# Patient Record
Sex: FEMALE | Race: WHITE | NOT HISPANIC OR LATINO | Employment: FULL TIME | URBAN - NONMETROPOLITAN AREA
[De-identification: names, ages, dates, MRNs, and addresses within clinical notes are randomized per-mention and may not be internally consistent; named-entity substitution may affect disease eponyms.]

---

## 2017-04-30 ENCOUNTER — HOSPITAL ENCOUNTER (OUTPATIENT)
Facility: HOSPITAL | Age: 27
Discharge: HOME | End: 2017-04-30
Attending: OBSTETRICS & GYNECOLOGY | Admitting: OBSTETRICS & GYNECOLOGY

## 2017-04-30 VITALS — HEIGHT: 66 IN | WEIGHT: 185.2 LBS | TEMPERATURE: 98.6 F | RESPIRATION RATE: 18 BRPM | BODY MASS INDEX: 29.77 KG/M2

## 2017-04-30 PROBLEM — Z34.90 PREGNANCY: Status: ACTIVE | Noted: 2017-04-30

## 2017-04-30 LAB
BILIRUB UR QL STRIP: NEGATIVE
CLARITY UR: CLEAR
COLOR UR: YELLOW
DEPRECATED RDW RBC AUTO: 44.9 FL (ref 37–54)
ERYTHROCYTE [DISTWIDTH] IN BLOOD BY AUTOMATED COUNT: 13.8 % (ref 11.5–14.5)
GLUCOSE UR STRIP-MCNC: NEGATIVE MG/DL
HCT VFR BLD AUTO: 35.1 % (ref 37–47)
HGB BLD-MCNC: 11.7 G/DL (ref 12–16)
HGB UR QL STRIP.AUTO: NEGATIVE
KETONES UR QL STRIP: NEGATIVE
LEUKOCYTE ESTERASE UR QL STRIP.AUTO: NEGATIVE
MCH RBC QN AUTO: 31.5 PG (ref 27–33)
MCHC RBC AUTO-ENTMCNC: 33.3 G/DL (ref 33–37)
MCV RBC AUTO: 94.4 FL (ref 80–94)
NITRITE UR QL STRIP: NEGATIVE
PH UR STRIP.AUTO: 8 [PH] (ref 5–8)
PLATELET # BLD AUTO: 187 10*3/MM3 (ref 130–400)
PMV BLD AUTO: 11.7 FL (ref 6–10)
PROT UR QL STRIP: NEGATIVE
RBC # BLD AUTO: 3.72 10*6/MM3 (ref 4.2–5.4)
SP GR UR STRIP: <=1.005 (ref 1–1.03)
UROBILINOGEN UR QL STRIP: NORMAL
WBC NRBC COR # BLD: 8.73 10*3/MM3 (ref 4.5–12.5)

## 2017-04-30 PROCEDURE — P9612 CATHETERIZE FOR URINE SPEC: HCPCS

## 2017-04-30 PROCEDURE — 81003 URINALYSIS AUTO W/O SCOPE: CPT | Performed by: OBSTETRICS & GYNECOLOGY

## 2017-04-30 PROCEDURE — G0463 HOSPITAL OUTPT CLINIC VISIT: HCPCS

## 2017-04-30 PROCEDURE — 85027 COMPLETE CBC AUTOMATED: CPT | Performed by: OBSTETRICS & GYNECOLOGY

## 2017-04-30 RX ORDER — PRENATAL VIT NO.126/IRON/FOLIC 28MG-0.8MG
TABLET ORAL DAILY
COMMUNITY
End: 2019-10-08

## 2019-10-06 ENCOUNTER — PREP FOR SURGERY (OUTPATIENT)
Dept: OTHER | Facility: HOSPITAL | Age: 29
End: 2019-10-06

## 2019-10-06 DIAGNOSIS — N80.9 ENDOMETRIOSIS DETERMINED BY LAPAROSCOPY: ICD-10-CM

## 2019-10-06 DIAGNOSIS — R10.2 PELVIC PAIN: Primary | ICD-10-CM

## 2019-10-06 RX ORDER — SODIUM CHLORIDE 0.9 % (FLUSH) 0.9 %
3 SYRINGE (ML) INJECTION EVERY 12 HOURS SCHEDULED
Status: CANCELLED | OUTPATIENT
Start: 2019-10-09

## 2019-10-06 RX ORDER — SODIUM CHLORIDE 0.9 % (FLUSH) 0.9 %
10 SYRINGE (ML) INJECTION AS NEEDED
Status: CANCELLED | OUTPATIENT
Start: 2019-10-09

## 2019-10-08 ENCOUNTER — APPOINTMENT (OUTPATIENT)
Dept: PREADMISSION TESTING | Facility: HOSPITAL | Age: 29
End: 2019-10-08

## 2019-10-08 DIAGNOSIS — R10.2 PELVIC PAIN: ICD-10-CM

## 2019-10-08 DIAGNOSIS — N80.9 ENDOMETRIOSIS DETERMINED BY LAPAROSCOPY: ICD-10-CM

## 2019-10-08 LAB
ABO GROUP BLD: NORMAL
BASOPHILS # BLD AUTO: 0.04 10*3/MM3 (ref 0–0.2)
BASOPHILS NFR BLD AUTO: 0.6 % (ref 0–1.5)
BLD GP AB SCN SERPL QL: NEGATIVE
DEPRECATED RDW RBC AUTO: 43.1 FL (ref 37–54)
EOSINOPHIL # BLD AUTO: 0.08 10*3/MM3 (ref 0–0.4)
EOSINOPHIL NFR BLD AUTO: 1.1 % (ref 0.3–6.2)
ERYTHROCYTE [DISTWIDTH] IN BLOOD BY AUTOMATED COUNT: 12.7 % (ref 12.3–15.4)
HCT VFR BLD AUTO: 46 % (ref 34–46.6)
HGB BLD-MCNC: 15 G/DL (ref 12–15.9)
IMM GRANULOCYTES # BLD AUTO: 0.01 10*3/MM3 (ref 0–0.05)
IMM GRANULOCYTES NFR BLD AUTO: 0.1 % (ref 0–0.5)
LYMPHOCYTES # BLD AUTO: 1.77 10*3/MM3 (ref 0.7–3.1)
LYMPHOCYTES NFR BLD AUTO: 24.8 % (ref 19.6–45.3)
MCH RBC QN AUTO: 30.2 PG (ref 26.6–33)
MCHC RBC AUTO-ENTMCNC: 32.6 G/DL (ref 31.5–35.7)
MCV RBC AUTO: 92.7 FL (ref 79–97)
MONOCYTES # BLD AUTO: 0.48 10*3/MM3 (ref 0.1–0.9)
MONOCYTES NFR BLD AUTO: 6.7 % (ref 5–12)
NEUTROPHILS # BLD AUTO: 4.75 10*3/MM3 (ref 1.7–7)
NEUTROPHILS NFR BLD AUTO: 66.7 % (ref 42.7–76)
PLATELET # BLD AUTO: 214 10*3/MM3 (ref 140–450)
PMV BLD AUTO: 12.6 FL (ref 6–12)
RBC # BLD AUTO: 4.96 10*6/MM3 (ref 3.77–5.28)
RH BLD: POSITIVE
T&S EXPIRATION DATE: NORMAL
WBC NRBC COR # BLD: 7.13 10*3/MM3 (ref 3.4–10.8)

## 2019-10-08 PROCEDURE — 85025 COMPLETE CBC W/AUTO DIFF WBC: CPT | Performed by: NURSE PRACTITIONER

## 2019-10-08 PROCEDURE — 86901 BLOOD TYPING SEROLOGIC RH(D): CPT | Performed by: NURSE PRACTITIONER

## 2019-10-08 PROCEDURE — 36415 COLL VENOUS BLD VENIPUNCTURE: CPT

## 2019-10-08 PROCEDURE — 86850 RBC ANTIBODY SCREEN: CPT | Performed by: NURSE PRACTITIONER

## 2019-10-08 PROCEDURE — 86900 BLOOD TYPING SEROLOGIC ABO: CPT | Performed by: NURSE PRACTITIONER

## 2019-10-08 RX ORDER — TRAZODONE HYDROCHLORIDE 100 MG/1
100 TABLET ORAL NIGHTLY
COMMUNITY
End: 2020-09-22 | Stop reason: SDUPTHER

## 2019-10-08 RX ORDER — ESCITALOPRAM OXALATE 20 MG/1
20 TABLET ORAL DAILY
COMMUNITY

## 2019-10-08 RX ORDER — TOPIRAMATE 50 MG/1
50 TABLET, FILM COATED ORAL 2 TIMES DAILY
COMMUNITY
End: 2020-09-22

## 2019-10-08 NOTE — DISCHARGE INSTRUCTIONS
1200----10/09/2019       ARRIVAL TIME    TAKE the following medications the morning of surgery:  All heart or blood pressure medications    HOLD all diabetic medications the morning of surgery as ordered by physician.    Please discontinue all blood thinners and anticoagulants (except aspirin) prior to surgery as per your surgeon and cardiologist instructions.  Aspirin may be continued up to the day prior to surgery.     CHLORHEXIDINE CLOTHS GIVEN WITH INSTRUCTIONS AND FORM TO RETURN TO HOSPITAL    General Instructions:  · Do not eat or drink after midnight: includes water, mints, or gum. You may brush your teeth.  Dental appliances that are removable must be taken out day of surgery.  · Do not smoke, chew tobacco, or drink alcohol.  · Bring medications in original bottles, any inhalers and if applicable your C-PAP/BI-PAP machine.  · Bring any papers given to you in the doctor's office.  · Wear clean comfortable clothes and socks.  · Do not wear contact lenses or make-up. Bring a case for your glasses if applicable.  · Bring crutches or walker if applicable.  · Leave all other valuables and jewelry at home.    If you were given a blood bank ID arm band remember to bring it with you the day of surgery.    Preventing a Surgical Site Infection:  Shower the night before surgery (unless instructed other wise) using a fresh bar of anti-bacterial soap (such as Dial) and clean washcloth. Dry with a clean towel and dress in clean clothing.  For 2 to 3 days before surgery, avoid shaving with a razor near where you will have surgery because the razor can irritate skin and make it easier to develop an infection. Ask your surgeon if you will be receiving antibiotics prior to surgery.  Make sure you, your family, and all healthcare providers clear their hands with soap and water or an alcohol-based hand  before caring for you or your wound.  If at all possible, quit smoking as many days before surgery as you can.    Day  of surgery:  Upon arrival, a Pre-op nurse and Anesthesiologist will review your health history, obtain vital signs, and answer questions you may have. The only belongings needed at this time will be your home medications and if applicable your C-PAP/BI-PAP machine. If you are staying overnight your family can leave the rest of your belongings in the car and bring them to your room later. A Pre-op nurse will start an IV and you may receive medication in preparation for surgery, including something to help you relax. Your family will be able to see you in the Pre-op area. While you are in surgery your family should notify the waiting room  if they leave the waiting room area and provide a contact phone number.    Please be aware that surgery does come with discomfort. We want to make every effort to control your discomfort so please discuss any uncontrolled symptoms with your nurse. Your doctor will most likely have prescribed pain medications.    If you are going home after surgery you will receive individualized written care instructions before being discharged. A responsible adult must drive you to and from the hospital on the day of surgery and stay with you for 24 hours.    If you are staying overnight following surgery, you will be transported to your hospital room following the recovery period.  [facility] has all private rooms.    If you have any questions please call Pre-Admission Testing at [phone].  Deductibles and co-payments are collected on the day of service. Please be prepared to pay the required co-pay, deductible or deposit on the day of service as defined by your plan.

## 2019-10-09 ENCOUNTER — ANESTHESIA (OUTPATIENT)
Dept: PERIOP | Facility: HOSPITAL | Age: 29
End: 2019-10-09

## 2019-10-09 ENCOUNTER — APPOINTMENT (OUTPATIENT)
Dept: GENERAL RADIOLOGY | Facility: HOSPITAL | Age: 29
End: 2019-10-09

## 2019-10-09 ENCOUNTER — ANESTHESIA EVENT (OUTPATIENT)
Dept: PERIOP | Facility: HOSPITAL | Age: 29
End: 2019-10-09

## 2019-10-09 ENCOUNTER — HOSPITAL ENCOUNTER (OUTPATIENT)
Facility: HOSPITAL | Age: 29
Setting detail: HOSPITAL OUTPATIENT SURGERY
Discharge: HOME | End: 2019-10-09
Attending: OBSTETRICS & GYNECOLOGY | Admitting: OBSTETRICS & GYNECOLOGY

## 2019-10-09 VITALS
BODY MASS INDEX: 23.97 KG/M2 | TEMPERATURE: 97.7 F | OXYGEN SATURATION: 99 % | RESPIRATION RATE: 18 BRPM | HEIGHT: 66 IN | DIASTOLIC BLOOD PRESSURE: 77 MMHG | HEART RATE: 74 BPM | SYSTOLIC BLOOD PRESSURE: 120 MMHG | WEIGHT: 149.13 LBS

## 2019-10-09 DIAGNOSIS — N80.9 ENDOMETRIOSIS DETERMINED BY LAPAROSCOPY: ICD-10-CM

## 2019-10-09 DIAGNOSIS — R10.2 PELVIC PAIN: ICD-10-CM

## 2019-10-09 LAB
B-HCG UR QL: NEGATIVE
INTERNAL NEGATIVE CONTROL: NEGATIVE
INTERNAL POSITIVE CONTROL: POSITIVE
Lab: NORMAL

## 2019-10-09 PROCEDURE — 25010000002 NEOSTIGMINE 10 MG/10ML SOLUTION: Performed by: NURSE ANESTHETIST, CERTIFIED REGISTERED

## 2019-10-09 PROCEDURE — 25010000002 PROPOFOL 10 MG/ML EMULSION: Performed by: NURSE ANESTHETIST, CERTIFIED REGISTERED

## 2019-10-09 PROCEDURE — 81025 URINE PREGNANCY TEST: CPT | Performed by: ANESTHESIOLOGY

## 2019-10-09 PROCEDURE — 25010000002 CEFOXITIN: Performed by: NURSE PRACTITIONER

## 2019-10-09 PROCEDURE — 25010000002 DEXAMETHASONE PER 1 MG: Performed by: NURSE ANESTHETIST, CERTIFIED REGISTERED

## 2019-10-09 PROCEDURE — 25010000003 LIDOCAINE 1 % SOLUTION: Performed by: NURSE ANESTHETIST, CERTIFIED REGISTERED

## 2019-10-09 PROCEDURE — 25010000002 ONDANSETRON PER 1 MG: Performed by: NURSE ANESTHETIST, CERTIFIED REGISTERED

## 2019-10-09 PROCEDURE — 25010000002 MIDAZOLAM PER 1 MG: Performed by: NURSE ANESTHETIST, CERTIFIED REGISTERED

## 2019-10-09 PROCEDURE — 25010000002 KETOROLAC TROMETHAMINE PER 15 MG: Performed by: NURSE ANESTHETIST, CERTIFIED REGISTERED

## 2019-10-09 PROCEDURE — 25010000002 FENTANYL CITRATE (PF) 100 MCG/2ML SOLUTION: Performed by: NURSE ANESTHETIST, CERTIFIED REGISTERED

## 2019-10-09 RX ORDER — IPRATROPIUM BROMIDE AND ALBUTEROL SULFATE 2.5; .5 MG/3ML; MG/3ML
3 SOLUTION RESPIRATORY (INHALATION) ONCE AS NEEDED
Status: DISCONTINUED | OUTPATIENT
Start: 2019-10-09 | End: 2019-10-09 | Stop reason: HOSPADM

## 2019-10-09 RX ORDER — SODIUM CHLORIDE 0.9 % (FLUSH) 0.9 %
3 SYRINGE (ML) INJECTION EVERY 12 HOURS SCHEDULED
Status: DISCONTINUED | OUTPATIENT
Start: 2019-10-09 | End: 2019-10-09 | Stop reason: HOSPADM

## 2019-10-09 RX ORDER — LIDOCAINE HYDROCHLORIDE 10 MG/ML
INJECTION, SOLUTION INFILTRATION; PERINEURAL AS NEEDED
Status: DISCONTINUED | OUTPATIENT
Start: 2019-10-09 | End: 2019-10-09 | Stop reason: SURG

## 2019-10-09 RX ORDER — SODIUM CHLORIDE, SODIUM LACTATE, POTASSIUM CHLORIDE, CALCIUM CHLORIDE 600; 310; 30; 20 MG/100ML; MG/100ML; MG/100ML; MG/100ML
125 INJECTION, SOLUTION INTRAVENOUS CONTINUOUS
Status: DISCONTINUED | OUTPATIENT
Start: 2019-10-09 | End: 2019-10-09 | Stop reason: HOSPADM

## 2019-10-09 RX ORDER — KETOROLAC TROMETHAMINE 30 MG/ML
INJECTION, SOLUTION INTRAMUSCULAR; INTRAVENOUS AS NEEDED
Status: DISCONTINUED | OUTPATIENT
Start: 2019-10-09 | End: 2019-10-09 | Stop reason: SURG

## 2019-10-09 RX ORDER — SODIUM CHLORIDE 0.9 % (FLUSH) 0.9 %
10 SYRINGE (ML) INJECTION AS NEEDED
Status: DISCONTINUED | OUTPATIENT
Start: 2019-10-09 | End: 2019-10-09 | Stop reason: HOSPADM

## 2019-10-09 RX ORDER — NEOSTIGMINE METHYLSULFATE 1 MG/ML
INJECTION, SOLUTION INTRAVENOUS AS NEEDED
Status: DISCONTINUED | OUTPATIENT
Start: 2019-10-09 | End: 2019-10-09 | Stop reason: SURG

## 2019-10-09 RX ORDER — FENTANYL CITRATE 50 UG/ML
INJECTION, SOLUTION INTRAMUSCULAR; INTRAVENOUS AS NEEDED
Status: DISCONTINUED | OUTPATIENT
Start: 2019-10-09 | End: 2019-10-09 | Stop reason: SURG

## 2019-10-09 RX ORDER — ONDANSETRON 2 MG/ML
INJECTION INTRAMUSCULAR; INTRAVENOUS AS NEEDED
Status: DISCONTINUED | OUTPATIENT
Start: 2019-10-09 | End: 2019-10-09 | Stop reason: SURG

## 2019-10-09 RX ORDER — ONDANSETRON 2 MG/ML
4 INJECTION INTRAMUSCULAR; INTRAVENOUS AS NEEDED
Status: DISCONTINUED | OUTPATIENT
Start: 2019-10-09 | End: 2019-10-09 | Stop reason: HOSPADM

## 2019-10-09 RX ORDER — IBUPROFEN 600 MG/1
600 TABLET ORAL EVERY 6 HOURS PRN
Qty: 30 TABLET | Refills: 0 | Status: SHIPPED | OUTPATIENT
Start: 2019-10-09 | End: 2019-11-08

## 2019-10-09 RX ORDER — FAMOTIDINE 10 MG/ML
INJECTION, SOLUTION INTRAVENOUS AS NEEDED
Status: DISCONTINUED | OUTPATIENT
Start: 2019-10-09 | End: 2019-10-09 | Stop reason: SURG

## 2019-10-09 RX ORDER — PROPOFOL 10 MG/ML
VIAL (ML) INTRAVENOUS AS NEEDED
Status: DISCONTINUED | OUTPATIENT
Start: 2019-10-09 | End: 2019-10-09 | Stop reason: SURG

## 2019-10-09 RX ORDER — MEPERIDINE HYDROCHLORIDE 25 MG/ML
12.5 INJECTION INTRAMUSCULAR; INTRAVENOUS; SUBCUTANEOUS
Status: DISCONTINUED | OUTPATIENT
Start: 2019-10-09 | End: 2019-10-09 | Stop reason: HOSPADM

## 2019-10-09 RX ORDER — ROCURONIUM BROMIDE 10 MG/ML
INJECTION, SOLUTION INTRAVENOUS AS NEEDED
Status: DISCONTINUED | OUTPATIENT
Start: 2019-10-09 | End: 2019-10-09 | Stop reason: SURG

## 2019-10-09 RX ORDER — FENTANYL CITRATE 50 UG/ML
50 INJECTION, SOLUTION INTRAMUSCULAR; INTRAVENOUS
Status: DISCONTINUED | OUTPATIENT
Start: 2019-10-09 | End: 2019-10-09 | Stop reason: HOSPADM

## 2019-10-09 RX ORDER — SODIUM CHLORIDE 0.9 % (FLUSH) 0.9 %
3-10 SYRINGE (ML) INJECTION AS NEEDED
Status: DISCONTINUED | OUTPATIENT
Start: 2019-10-09 | End: 2019-10-09 | Stop reason: HOSPADM

## 2019-10-09 RX ORDER — DEXAMETHASONE SODIUM PHOSPHATE 4 MG/ML
INJECTION, SOLUTION INTRA-ARTICULAR; INTRALESIONAL; INTRAMUSCULAR; INTRAVENOUS; SOFT TISSUE AS NEEDED
Status: DISCONTINUED | OUTPATIENT
Start: 2019-10-09 | End: 2019-10-09 | Stop reason: SURG

## 2019-10-09 RX ORDER — GLYCOPYRROLATE 0.2 MG/ML
INJECTION INTRAMUSCULAR; INTRAVENOUS AS NEEDED
Status: DISCONTINUED | OUTPATIENT
Start: 2019-10-09 | End: 2019-10-09 | Stop reason: SURG

## 2019-10-09 RX ORDER — HYDROCODONE BITARTRATE AND ACETAMINOPHEN 5; 325 MG/1; MG/1
1 TABLET ORAL EVERY 6 HOURS PRN
Qty: 10 TABLET | Refills: 0 | Status: SHIPPED | OUTPATIENT
Start: 2019-10-09 | End: 2020-05-07

## 2019-10-09 RX ORDER — MIDAZOLAM HYDROCHLORIDE 1 MG/ML
INJECTION INTRAMUSCULAR; INTRAVENOUS AS NEEDED
Status: DISCONTINUED | OUTPATIENT
Start: 2019-10-09 | End: 2019-10-09 | Stop reason: SURG

## 2019-10-09 RX ORDER — OXYCODONE HYDROCHLORIDE AND ACETAMINOPHEN 5; 325 MG/1; MG/1
1 TABLET ORAL ONCE AS NEEDED
Status: DISCONTINUED | OUTPATIENT
Start: 2019-10-09 | End: 2019-10-09 | Stop reason: HOSPADM

## 2019-10-09 RX ADMIN — NEOSTIGMINE METHYLSULFATE 3 MG: 1 INJECTION, SOLUTION INTRAVENOUS at 12:50

## 2019-10-09 RX ADMIN — ONDANSETRON 4 MG: 2 INJECTION, SOLUTION INTRAMUSCULAR; INTRAVENOUS at 13:50

## 2019-10-09 RX ADMIN — PROPOFOL 150 MG: 10 INJECTION, EMULSION INTRAVENOUS at 12:27

## 2019-10-09 RX ADMIN — EPHEDRINE SULFATE 10 MG: 50 INJECTION, SOLUTION INTRAVENOUS at 12:47

## 2019-10-09 RX ADMIN — GLYCOPYRROLATE 0.4 MG: 0.4 INJECTION INTRAMUSCULAR; INTRAVENOUS at 12:50

## 2019-10-09 RX ADMIN — KETOROLAC TROMETHAMINE 30 MG: 30 INJECTION, SOLUTION INTRAMUSCULAR; INTRAVENOUS at 12:56

## 2019-10-09 RX ADMIN — MIDAZOLAM HYDROCHLORIDE 2 MG: 1 INJECTION, SOLUTION INTRAMUSCULAR; INTRAVENOUS at 12:23

## 2019-10-09 RX ADMIN — FENTANYL CITRATE 50 MCG: 50 INJECTION INTRAMUSCULAR; INTRAVENOUS at 12:27

## 2019-10-09 RX ADMIN — ONDANSETRON 4 MG: 2 INJECTION INTRAMUSCULAR; INTRAVENOUS at 12:37

## 2019-10-09 RX ADMIN — FAMOTIDINE 20 MG: 10 INJECTION INTRAVENOUS at 12:23

## 2019-10-09 RX ADMIN — FENTANYL CITRATE 50 MCG: 50 INJECTION INTRAMUSCULAR; INTRAVENOUS at 12:39

## 2019-10-09 RX ADMIN — CEFOXITIN 2 G: 2 INJECTION, POWDER, FOR SOLUTION INTRAVENOUS at 12:23

## 2019-10-09 RX ADMIN — SODIUM CHLORIDE, POTASSIUM CHLORIDE, SODIUM LACTATE AND CALCIUM CHLORIDE 125 ML/HR: 600; 310; 30; 20 INJECTION, SOLUTION INTRAVENOUS at 11:36

## 2019-10-09 RX ADMIN — DEXAMETHASONE SODIUM PHOSPHATE 8 MG: 4 INJECTION, SOLUTION INTRAMUSCULAR; INTRAVENOUS at 12:37

## 2019-10-09 RX ADMIN — LIDOCAINE HYDROCHLORIDE 60 MG: 10 INJECTION, SOLUTION INFILTRATION; PERINEURAL at 12:27

## 2019-10-09 RX ADMIN — ROCURONIUM BROMIDE 25 MG: 10 SOLUTION INTRAVENOUS at 12:27

## 2019-10-09 NOTE — ANESTHESIA PREPROCEDURE EVALUATION
Anesthesia Evaluation     Patient summary reviewed and Nursing notes reviewed   no history of anesthetic complications:  NPO Solid Status: > 8 hours  NPO Liquid Status: > 8 hours           Airway   Mallampati: II  TM distance: >3 FB  Neck ROM: full  no difficulty expected  Dental - normal exam     Pulmonary - negative pulmonary ROS and normal exam   (-) asthma, not a smoker  Cardiovascular - normal exam  Exercise tolerance: good (4-7 METS)    NYHA Classification: I    (+) valvular problems/murmurs murmur,   (-) dysrhythmias, angina, CHF      Neuro/Psych- negative ROS  (-) seizures  GI/Hepatic/Renal/Endo - negative ROS   (-) diabetes, hypothyroidism    Musculoskeletal (-) negative ROS    Abdominal  - normal exam    Bowel sounds: normal.   Substance History - negative use     OB/GYN negative ob/gyn ROS   (-)  Pregnant        Other - negative ROS       ROS/Med Hx Other: Hx PCOS                    Anesthesia Plan    ASA 2     general     intravenous induction   Anesthetic plan, all risks, benefits, and alternatives have been provided, discussed and informed consent has been obtained with: patient.    Plan discussed with CRNA.

## 2019-10-09 NOTE — ANESTHESIA POSTPROCEDURE EVALUATION
Patient: Latisha Pedraza    Procedure Summary     Date:  10/09/19 Room / Location:   [facility]  /  [facility]    Anesthesia Start:  1223 Anesthesia Stop:  1306    Procedure:  LAPAROSCOPY lysis of adhesions (N/A Vagina) Diagnosis:  (R10.2 N80.9)    Surgeon:  Chapin Russell DO Provider:  Alvin Garber DO    Anesthesia Type:  general ASA Status:  2          Anesthesia Type: general  Last vitals  BP   120/77 (10/09/19 1411)   Temp   97.7 °F (36.5 °C) (10/09/19 1411)   Pulse   74 (10/09/19 1411)   Resp   18 (10/09/19 1411)     SpO2   99 % (10/09/19 1411)     Post Anesthesia Care and Evaluation    Patient location during evaluation: PHASE II  Patient participation: complete - patient participated  Level of consciousness: awake and alert  Pain score: 1  Pain management: adequate  Airway patency: patent  Anesthetic complications: No anesthetic complications  PONV Status: controlled  Cardiovascular status: acceptable  Respiratory status: acceptable  Hydration status: acceptable

## 2019-10-09 NOTE — ANESTHESIA PROCEDURE NOTES
Airway  Urgency: elective    Date/Time: 10/9/2019 12:28 PM  Airway not difficult    General Information and Staff    Patient location during procedure: OR    Indications and Patient Condition  Indications for airway management: airway protection    Preoxygenated: yes  Mask difficulty assessment: 1 - vent by mask    Final Airway Details  Final airway type: endotracheal airway      Successful airway: ETT  Cuffed: yes   Successful intubation technique: direct laryngoscopy  Facilitating devices/methods: intubating stylet  Endotracheal tube insertion site: oral  Blade: Elise  Blade size: 3  ETT size (mm): 7.0  Cormack-Lehane Classification: grade I - full view of glottis  Placement verified by: chest auscultation and capnometry   Measured from: lips  ETT/EBT  to lips (cm): 21  Number of attempts at approach: 1  Assessment: lips, teeth, and gum same as pre-op and atraumatic intubation

## 2019-10-09 NOTE — OP NOTE
DIAGNOSTIC LAPAROSCOPY  Procedure Note    Latisha Pedraza  10/9/2019    Pre-op Diagnosis:   Pelvic pain  History of endometriosis    Post-op Diagnosis:     Pelvic pain  History of endometriosis  Pelvic adhesions    Procedure(s):  Laparoscopic lysis of adhesions    Surgeon(s):  Chapin Russell DO    Anesthesia: General    Estimated Blood Loss: minimal    Specimens:    Order Name Source Comment Collection Info Order Time   PREGNANCY, URINE    10/9/2019 11:08 AM         Procedure:  The patient was taken to the operating room, given general anesthesia, prepped and draped in the usual sterile fashion.  The bladder was drained with a straight catheter.  A speculum was placed into the vagina and a uterine manipulator was placed.  The surgeon was regloved and attention made to the abdomen.      A one centimeter incision was made into the umbilicus and an OPTIVIEW trocar was placed into the abdomen under direct visualization using the laparoscope.  The abdomen was filled with CO2 gas up to 15mm mercury pressure.  A second 5 millimeter trocar was placed suprapubically in the midline.  We examined the pelvis.  There were some omental adhesions to the anterior pelvis to the right of the uterus.  This was taken down using the harmonic scalpel.  The rest of the abdomen was surveyed and found to be completely normal.  There were some peritoneal windows in the posterior cul-de-sac but no endometriosis was seen.    We removed the CO2 gas and trocars.  The skin incisions were closed with a 4-0 monocryl and surgical adhesive.      Sponge, lap and needle counts were correct.       Findings: There was no endometriosis.  There was some omental adhesions to the pelvis anterior into the right side of the uterus    Complications: none    Grafts or Implants: NA    Chapin Russell DO     Date: 10/9/2019  Time: 12:55 PM

## 2020-04-15 ENCOUNTER — LAB (OUTPATIENT)
Dept: LAB | Facility: HOSPITAL | Age: 30
End: 2020-04-15

## 2020-04-15 ENCOUNTER — TRANSCRIBE ORDERS (OUTPATIENT)
Dept: ADMINISTRATIVE | Facility: HOSPITAL | Age: 30
End: 2020-04-15

## 2020-04-15 DIAGNOSIS — J06.9 ACUTE RESPIRATORY DISEASE: Primary | ICD-10-CM

## 2020-04-15 DIAGNOSIS — J06.9 ACUTE RESPIRATORY DISEASE: ICD-10-CM

## 2020-04-15 PROCEDURE — U0003 INFECTIOUS AGENT DETECTION BY NUCLEIC ACID (DNA OR RNA); SEVERE ACUTE RESPIRATORY SYNDROME CORONAVIRUS 2 (SARS-COV-2) (CORONAVIRUS DISEASE [COVID-19]), AMPLIFIED PROBE TECHNIQUE, MAKING USE OF HIGH THROUGHPUT TECHNOLOGIES AS DESCRIBED BY CMS-2020-01-R: HCPCS

## 2020-04-15 PROCEDURE — 87635 SARS-COV-2 COVID-19 AMP PRB: CPT

## 2020-04-17 LAB — SARS-COV-2 RNA RESP QL NAA+PROBE: NOT DETECTED

## 2020-05-07 ENCOUNTER — OFFICE VISIT (OUTPATIENT)
Dept: UROLOGY | Facility: CLINIC | Age: 30
End: 2020-05-07

## 2020-05-07 VITALS — WEIGHT: 166 LBS | BODY MASS INDEX: 26.68 KG/M2 | HEIGHT: 66 IN | TEMPERATURE: 98.6 F

## 2020-05-07 DIAGNOSIS — R33.9 INCOMPLETE BLADDER EMPTYING: ICD-10-CM

## 2020-05-07 DIAGNOSIS — R35.0 FREQUENCY OF MICTURITION: Primary | ICD-10-CM

## 2020-05-07 DIAGNOSIS — N39.0 RECURRENT UTI: ICD-10-CM

## 2020-05-07 DIAGNOSIS — K58.1 IRRITABLE BOWEL SYNDROME WITH CONSTIPATION: ICD-10-CM

## 2020-05-07 PROCEDURE — 87147 CULTURE TYPE IMMUNOLOGIC: CPT | Performed by: UROLOGY

## 2020-05-07 PROCEDURE — 99204 OFFICE O/P NEW MOD 45 MIN: CPT | Performed by: UROLOGY

## 2020-05-07 PROCEDURE — 87086 URINE CULTURE/COLONY COUNT: CPT | Performed by: UROLOGY

## 2020-05-07 RX ORDER — ONDANSETRON 4 MG/1
4 TABLET, FILM COATED ORAL EVERY 12 HOURS PRN
COMMUNITY
Start: 2020-04-20 | End: 2020-12-10

## 2020-05-07 RX ORDER — CIPROFLOXACIN 250 MG/1
TABLET, FILM COATED ORAL
COMMUNITY
Start: 2020-05-05 | End: 2020-05-07

## 2020-05-07 RX ORDER — NITROFURANTOIN 25; 75 MG/1; MG/1
100 CAPSULE ORAL DAILY
Qty: 56 CAPSULE | Refills: 3 | Status: SHIPPED | OUTPATIENT
Start: 2020-05-07 | End: 2020-10-19

## 2020-05-08 PROBLEM — K58.1 IRRITABLE BOWEL SYNDROME WITH CONSTIPATION: Status: ACTIVE | Noted: 2020-05-08

## 2020-05-08 PROBLEM — N39.0 RECURRENT UTI: Status: ACTIVE | Noted: 2020-05-08

## 2020-05-08 LAB
BACTERIA SPEC AEROBE CULT: ABNORMAL
STREP GROUPING: ABNORMAL

## 2020-05-12 ENCOUNTER — TELEPHONE (OUTPATIENT)
Dept: UROLOGY | Facility: CLINIC | Age: 30
End: 2020-05-12

## 2020-05-12 NOTE — TELEPHONE ENCOUNTER
I called the patient and told her that her urine culture came back with no pathogen detail so I told her that Dr Baum wants her to continue the Macrobid but take it BID for 5 days and then go back to one a day for prophylactic treatment.

## 2020-05-26 ENCOUNTER — HOSPITAL ENCOUNTER (OUTPATIENT)
Dept: CT IMAGING | Facility: HOSPITAL | Age: 30
Discharge: HOME | End: 2020-05-26
Admitting: UROLOGY

## 2020-05-26 DIAGNOSIS — N39.0 RECURRENT UTI: ICD-10-CM

## 2020-05-26 PROCEDURE — 74176 CT ABD & PELVIS W/O CONTRAST: CPT

## 2020-05-26 PROCEDURE — 74176 CT ABD & PELVIS W/O CONTRAST: CPT | Performed by: RADIOLOGY

## 2020-05-26 PROCEDURE — 81025 URINE PREGNANCY TEST: CPT | Performed by: UROLOGY

## 2020-06-02 ENCOUNTER — TELEPHONE (OUTPATIENT)
Dept: UROLOGY | Facility: CLINIC | Age: 30
End: 2020-06-02

## 2020-06-02 DIAGNOSIS — N39.0 RECURRENT UTI: Primary | ICD-10-CM

## 2020-06-04 RX ORDER — PHENAZOPYRIDINE HYDROCHLORIDE 200 MG/1
200 TABLET, FILM COATED ORAL 3 TIMES DAILY PRN
Qty: 20 TABLET | Refills: 0 | Status: SHIPPED | OUTPATIENT
Start: 2020-06-04 | End: 2020-12-10

## 2020-06-04 NOTE — TELEPHONE ENCOUNTER
I asked Dr Baum to look at her CT and he said that she had calcification in both kidneys and that she needed to keep her follow up on 6/12. I called and told her she said that she was doing good other than the burning when she voided. I sent her some pyridium to her pharmacy

## 2020-06-12 ENCOUNTER — OFFICE VISIT (OUTPATIENT)
Dept: UROLOGY | Facility: CLINIC | Age: 30
End: 2020-06-12

## 2020-06-12 VITALS — HEIGHT: 66 IN | TEMPERATURE: 97.1 F | BODY MASS INDEX: 25.39 KG/M2 | WEIGHT: 158 LBS

## 2020-06-12 DIAGNOSIS — N29 NEPHROCALCINOSIS: ICD-10-CM

## 2020-06-12 DIAGNOSIS — E83.59 NEPHROCALCINOSIS: ICD-10-CM

## 2020-06-12 DIAGNOSIS — R10.9 FLANK PAIN: ICD-10-CM

## 2020-06-12 DIAGNOSIS — R35.0 FREQUENCY OF MICTURITION: Primary | ICD-10-CM

## 2020-06-12 DIAGNOSIS — N39.0 RECURRENT UTI: ICD-10-CM

## 2020-06-12 DIAGNOSIS — J90 PLEURAL EFFUSION, BILATERAL: ICD-10-CM

## 2020-06-12 LAB
BILIRUB BLD-MCNC: ABNORMAL MG/DL
CLARITY, POC: CLEAR
COLOR UR: YELLOW
GLUCOSE UR STRIP-MCNC: NEGATIVE MG/DL
KETONES UR QL: NEGATIVE
LEUKOCYTE EST, POC: NEGATIVE
NITRITE UR-MCNC: NEGATIVE MG/ML
PH UR: 7.5 [PH] (ref 5–8)
PROT UR STRIP-MCNC: NEGATIVE MG/DL
RBC # UR STRIP: NEGATIVE /UL
SP GR UR: 1.01 (ref 1–1.03)
UROBILINOGEN UR QL: ABNORMAL

## 2020-06-12 PROCEDURE — 87086 URINE CULTURE/COLONY COUNT: CPT | Performed by: UROLOGY

## 2020-06-12 PROCEDURE — 99214 OFFICE O/P EST MOD 30 MIN: CPT | Performed by: UROLOGY

## 2020-06-12 NOTE — PROGRESS NOTES
"Chief Complaint:          Chief Complaint   Patient presents with   • Recurrent UTI     1 mnth f/u       HPI:   30 y.o. female refrred with recurrent urinary tract infections.  Was on Macrobid, changed to Cipro and then stop the culture came back no growth.  She works at [employer].  She has recurrent infections for 2 years.  She has had lots of treatment with Azo.  Her first MD told her that she was \"not wiping well\" with infection she gets dysuria, frequency and urgency.  The last when she had a temp of 102.  She has been treated with Macrobid and Cipro.  Not necessarily related to a positive culture but she had a positive nitrite test.  She has severe irritable bowel syndrome-constipation with a bowel movement every 2 weeks.  Her postvoid residual 0.  She is a  2 para 2 she has had 3 laparoscopic surgeries for endometriosis she has a familial history with her grandfather having kidney cancer.  I am to culture the urine today her examination was unremarkable today.  She had right lower quadrant tenderness and palpable stool.  I am good initiate therapy with probiotics, Macrobid chronically and Linzess I will see her back in 1 month.  She returns today.  She is currently on Macrodantin prophylaxis for recurrent urinary tract infections.  I reviewed her CT scan that actually showed very faint bilateral nephrocalcinosis certainly very early but I told her about increasing fluids.  She also had bilateral pleural effusions of unknown etiology.  And questionable large uterus that may be the cause of some of her pain.  I told her to run this by her family physician and gynecologist but other than I have a culture pending she is doing well on Monday continue recommend observation if she breaks through with infection she is to return to clinic immediately        Very faint bilateral calcifications consistent with very early nephrocalcinosis.  I strongly recommended she continue her prophylactic antibiotics.  She " is a medical assistant at [employer].  She is currently on appropriate therapy.    Past Medical History:        Past Medical History:   Diagnosis Date   • Anxiety    • Back pain    • Constipation    • Depression    • Endometriosis    • Heart murmur    • Heart murmur    • Infection, Shigella 2005   • Migraines    • Ovarian cyst    • Pain     PELVIC   • PCOS (polycystic ovarian syndrome)    • Pelvic pain    • Wrist fracture          Current Meds:     Current Outpatient Medications   Medication Sig Dispense Refill   • escitalopram (LEXAPRO) 20 MG tablet Take 20 mg by mouth Daily.     • linaclotide (LINZESS) 290 MCG capsule capsule Take 290 mcg by mouth Every Morning Before Breakfast.     • metFORMIN (GLUCOPHAGE) 500 MG tablet Take 500 mg by mouth 2 (Two) Times a Day With Meals.     • nitrofurantoin, macrocrystal-monohydrate, (Macrobid) 100 MG capsule Take 1 capsule by mouth Daily. 56 capsule 3   • ondansetron (ZOFRAN) 4 MG tablet      • phenazopyridine (Pyridium) 200 MG tablet Take 1 tablet by mouth 3 (Three) Times a Day As Needed for Bladder Spasms. 20 tablet 0   • topiramate (TOPAMAX) 50 MG tablet Take 50 mg by mouth 2 (Two) Times a Day.     • traZODone (DESYREL) 100 MG tablet Take 100 mg by mouth Every Night.       No current facility-administered medications for this visit.         Allergies:      No Known Allergies     Past Surgical History:     Past Surgical History:   Procedure Laterality Date   • ABDOMINAL SURGERY     • DIAGNOSTIC LAPAROSCOPY  2009   • DIAGNOSTIC LAPAROSCOPY N/A 7/6/2016    Procedure: DIAGNOSTIC LAPAROSCOPY IUD REMOVAL, FULGERATION OF ENDOMETRIOSIS;  Surgeon: Chapin Russell DO;  Location: [facility] OR;  Service:    • DIAGNOSTIC LAPAROSCOPY N/A 10/9/2019    Procedure: LAPAROSCOPY lysis of adhesions;  Surgeon: Chapin Russell DO;  Location: [facility] OR;  Service: Obstetrics/Gynecology         Social History:     Social History     Socioeconomic History   • Marital status:       Spouse name: Not on file   • Number of children: Not on file   • Years of education: Not on file   • Highest education level: Not on file   Tobacco Use   • Smoking status: Never Smoker   • Smokeless tobacco: Never Used   Substance and Sexual Activity   • Alcohol use: No   • Drug use: No   • Sexual activity: Yes     Partners: Male     Birth control/protection: IUD       Family History:     Family History   Problem Relation Age of Onset   • No Known Problems Father    • No Known Problems Mother    • Diabetes Maternal Grandmother    • Kidney disease Maternal Grandfather    • Heart disease Maternal Grandfather        Review of Systems:     Review of Systems   Constitutional: Negative.  Negative for activity change, appetite change, chills, diaphoresis, fatigue and unexpected weight change.   HENT: Negative for congestion, dental problem, drooling, ear discharge, ear pain, facial swelling, hearing loss, mouth sores, nosebleeds, postnasal drip, rhinorrhea, sinus pressure, sneezing, sore throat, tinnitus, trouble swallowing and voice change.    Eyes: Negative.  Negative for photophobia, pain, discharge, redness, itching and visual disturbance.   Respiratory: Negative.  Negative for apnea, cough, choking, chest tightness, shortness of breath, wheezing and stridor.    Cardiovascular: Negative.  Negative for chest pain, palpitations and leg swelling.   Gastrointestinal: Negative.  Negative for abdominal distention, abdominal pain, anal bleeding, blood in stool, constipation, diarrhea, nausea, rectal pain and vomiting.   Endocrine: Negative.  Negative for cold intolerance, heat intolerance, polydipsia, polyphagia and polyuria.   Musculoskeletal: Negative.  Negative for arthralgias, back pain, gait problem, joint swelling, myalgias, neck pain and neck stiffness.   Skin: Negative.  Negative for color change, pallor, rash and wound.   Allergic/Immunologic: Negative.  Negative for environmental allergies, food allergies and  immunocompromised state.   Neurological: Negative.  Negative for dizziness, tremors, seizures, syncope, facial asymmetry, speech difficulty, weakness, light-headedness, numbness and headaches.   Hematological: Negative.  Negative for adenopathy. Does not bruise/bleed easily.   Psychiatric/Behavioral: Negative for agitation, behavioral problems, confusion, decreased concentration, dysphoric mood, hallucinations, self-injury, sleep disturbance and suicidal ideas. The patient is not nervous/anxious and is not hyperactive.    All other systems reviewed and are negative.      Physical Exam:     Physical Exam   Constitutional: She appears well-developed and well-nourished.   HENT:   Head: Normocephalic and atraumatic.   Right Ear: External ear normal.   Left Ear: External ear normal.   Mouth/Throat: Oropharynx is clear and moist.   Eyes: Pupils are equal, round, and reactive to light. Conjunctivae are normal.   Cardiovascular: Normal rate, regular rhythm, normal heart sounds and intact distal pulses.   Pulmonary/Chest: Effort normal and breath sounds normal.   Abdominal: Soft. Bowel sounds are normal. She exhibits no distension and no mass. There is no tenderness. There is no rebound and no guarding.   Genitourinary: No vaginal discharge found.   Musculoskeletal: Normal range of motion.   Neurological: She is alert. She has normal reflexes.   Skin: Skin is warm and dry.   Psychiatric: She has a normal mood and affect. Her behavior is normal. Judgment and thought content normal.       I have reviewed the following portions of the patient's history: allergies, current medications, past family history, past medical history, past social history, past surgical history, problem list and ROS and confirm it's accurate.      Procedure:       Assessment/Plan:   Recurrent urinary tract infections-patient has been referred and diagnosed with recurrent urinary tract infections.  We discussed the types of organisms that are found in  the urinary tract indicating that the vast majority are results of the patient's own gastrointestinal isa.  We discussed how many of the antibiotics that are utilized can actually exacerbate these infections by creating resistant organisms and there is only a very few antibiotics that are concentrated in the urine and do not affect the rectal reservoir nor cause recurrent yeast vaginitis.  We discussed the risk factors for recurrent infections being intercourse in younger patients and atrophic changes in older patients.  We discussed the symptoms that are found including pain, pressure, burning, frequency, urgency suprapubic pain and painful intercourse.  I discussed upper tract symptoms including fevers, chills, and indicated the workup would be much more aggressive if the patient were to present with recurrent infections in the face of upper tract symptomatology such as fever.  I discussed the history of vesicoureteral reflux in young patients and finally chronic renal scarring as a result of such.  I recommend concomitant probiotics with treatment with antibiotics to protect the rectal reservoir including over-the-counter yogurt preparations to brenna oral pills containing the appropriate probiotics.  She continues on Macrobid.  She has a urine culture pending at the time of this dictation.  Nephrocalcinosis-very faint but clearly visible I told her to increase fluids avoid cola products etc.  If it progresses we may need to address it but currently its very minimal  Bilateral pleural effusions-unknown etiology they are minimal but certainly may contribute to some upper tract splinting pain I told her to check with her family physician            Patient's Body mass index is 25.5 kg/m². BMI is above normal parameters. Recommendations include: educational material.              This document has been electronically signed by MARYANN COLBERT MD June 12, 2020 14:14

## 2020-06-13 LAB — BACTERIA SPEC AEROBE CULT: NO GROWTH

## 2020-06-15 ENCOUNTER — TELEPHONE (OUTPATIENT)
Dept: UROLOGY | Facility: CLINIC | Age: 30
End: 2020-06-15

## 2020-06-15 PROBLEM — N29 NEPHROCALCINOSIS: Status: ACTIVE | Noted: 2020-06-15

## 2020-06-15 PROBLEM — J90 PLEURAL EFFUSION, BILATERAL: Status: ACTIVE | Noted: 2020-06-15

## 2020-06-15 PROBLEM — E83.59 NEPHROCALCINOSIS: Status: ACTIVE | Noted: 2020-06-15

## 2020-09-09 ENCOUNTER — OFFICE VISIT (OUTPATIENT)
Dept: UROLOGY | Facility: CLINIC | Age: 30
End: 2020-09-09

## 2020-09-09 VITALS — TEMPERATURE: 98.5 F | WEIGHT: 162.8 LBS | BODY MASS INDEX: 26.16 KG/M2 | HEIGHT: 66 IN

## 2020-09-09 DIAGNOSIS — R33.9 INCOMPLETE EMPTYING OF BLADDER: ICD-10-CM

## 2020-09-09 DIAGNOSIS — R10.9 BILATERAL FLANK PAIN: ICD-10-CM

## 2020-09-09 DIAGNOSIS — N39.0 RECURRENT UTI: Primary | ICD-10-CM

## 2020-09-09 LAB
BILIRUB BLD-MCNC: ABNORMAL MG/DL
CLARITY, POC: ABNORMAL
COLOR UR: YELLOW
GLUCOSE UR STRIP-MCNC: NEGATIVE MG/DL
KETONES UR QL: NEGATIVE
LEUKOCYTE EST, POC: NEGATIVE
NITRITE UR-MCNC: NEGATIVE MG/ML
PH UR: 5.5 [PH] (ref 5–8)
PROT UR STRIP-MCNC: ABNORMAL MG/DL
RBC # UR STRIP: NEGATIVE /UL
SP GR UR: 1.03 (ref 1–1.03)
UROBILINOGEN UR QL: NORMAL

## 2020-09-09 PROCEDURE — 87086 URINE CULTURE/COLONY COUNT: CPT | Performed by: NURSE PRACTITIONER

## 2020-09-09 PROCEDURE — 99214 OFFICE O/P EST MOD 30 MIN: CPT | Performed by: NURSE PRACTITIONER

## 2020-09-09 PROCEDURE — 51798 US URINE CAPACITY MEASURE: CPT | Performed by: NURSE PRACTITIONER

## 2020-09-09 RX ORDER — ETODOLAC 400 MG/1
400 TABLET, FILM COATED ORAL 2 TIMES DAILY
Qty: 20 TABLET | Refills: 1 | Status: SHIPPED | OUTPATIENT
Start: 2020-09-09 | End: 2020-12-10

## 2020-09-09 NOTE — PATIENT INSTRUCTIONS
Urinary Tract Infection, Adult  A urinary tract infection (UTI) is an infection of any part of the urinary tract. The urinary tract includes:  · The kidneys.  · The ureters.  · The bladder.  · The urethra.  These organs make, store, and get rid of pee (urine) in the body.  What are the causes?  This is caused by germs (bacteria) in your genital area. These germs grow and cause swelling (inflammation) of your urinary tract.  What increases the risk?  You are more likely to develop this condition if:  · You have a small, thin tube (catheter) to drain pee.  · You cannot control when you pee or poop (incontinence).  · You are female, and:  ? You use these methods to prevent pregnancy:  ? A medicine that kills sperm (spermicide).  ? A device that blocks sperm (diaphragm).  ? You have low levels of a female hormone (estrogen).  ? You are pregnant.  · You have genes that add to your risk.  · You are sexually active.  · You take antibiotic medicines.  · You have trouble peeing because of:  ? A prostate that is bigger than normal, if you are male.  ? A blockage in the part of your body that drains pee from the bladder (urethra).  ? A kidney stone.  ? A nerve condition that affects your bladder (neurogenic bladder).  ? Not getting enough to drink.  ? Not peeing often enough.  · You have other conditions, such as:  ? Diabetes.  ? A weak disease-fighting system (immune system).  ? Sickle cell disease.  ? Gout.  ? Injury of the spine.  What are the signs or symptoms?  Symptoms of this condition include:  · Needing to pee right away (urgently).  · Peeing often.  · Peeing small amounts often.  · Pain or burning when peeing.  · Blood in the pee.  · Pee that smells bad or not like normal.  · Trouble peeing.  · Pee that is cloudy.  · Fluid coming from the vagina, if you are female.  · Pain in the belly or lower back.  Other symptoms include:  · Throwing up (vomiting).  · No urge to eat.  · Feeling mixed up (confused).  · Being tired  and grouchy (irritable).  · A fever.  · Watery poop (diarrhea).  How is this treated?  This condition may be treated with:  · Antibiotic medicine.  · Other medicines.  · Drinking enough water.  Follow these instructions at home:    Medicines  · Take over-the-counter and prescription medicines only as told by your doctor.  · If you were prescribed an antibiotic medicine, take it as told by your doctor. Do not stop taking it even if you start to feel better.  General instructions  · Make sure you:  ? Pee until your bladder is empty.  ? Do not hold pee for a long time.  ? Empty your bladder after sex.  ? Wipe from front to back after pooping if you are a female. Use each tissue one time when you wipe.  · Drink enough fluid to keep your pee pale yellow.  · Keep all follow-up visits as told by your doctor. This is important.  Contact a doctor if:  · You do not get better after 1-2 days.  · Your symptoms go away and then come back.  Get help right away if:  · You have very bad back pain.  · You have very bad pain in your lower belly.  · You have a fever.  · You are sick to your stomach (nauseous).  · You are throwing up.  Summary  · A urinary tract infection (UTI) is an infection of any part of the urinary tract.  · This condition is caused by germs in your genital area.  · There are many risk factors for a UTI. These include having a small, thin tube to drain pee and not being able to control when you pee or poop.  · Treatment includes antibiotic medicines for germs.  · Drink enough fluid to keep your pee pale yellow.  This information is not intended to replace advice given to you by your health care provider. Make sure you discuss any questions you have with your health care provider.  Document Released: 06/05/2009 Document Revised: 12/05/2019 Document Reviewed: 06/27/2019  [employer] Patient Education © 2020 [employer]  Dysuria  Dysuria is pain or discomfort while urinating. The pain or discomfort may be felt in the  part of your body that drains urine from the bladder (urethra) or in the surrounding tissue of the genitals. The pain may also be felt in the groin area, lower abdomen, or lower back. You may have to urinate frequently or have the sudden feeling that you have to urinate (urgency). Dysuria can affect both men and women, but it is more common in women.  Dysuria can be caused by many different things, including:  · Urinary tract infection.  · Kidney stones or bladder stones.  · Certain sexually transmitted infections (STIs), such as chlamydia.  · Dehydration.  · Inflammation of the tissues of the vagina.  · Use of certain medicines.  · Use of certain soaps or scented products that cause irritation.  Follow these instructions at home:  General instructions  · Watch your condition for any changes.  · Urinate often. Avoid holding urine for long periods of time.  · After a bowel movement or urination, women should cleanse from front to back, using each tissue only once.  · Urinate after sexual intercourse.  · Keep all follow-up visits as told by your health care provider. This is important.  · If you had any tests done to find the cause of dysuria, it is up to you to get your test results. Ask your health care provider, or the department that is doing the test, when your results will be ready.  Eating and drinking    · Drink enough fluid to keep your urine pale yellow.  · Avoid caffeine, tea, and alcohol. They can irritate the bladder and make dysuria worse. In men, alcohol may irritate the prostate.  Medicines  · Take over-the-counter and prescription medicines only as told by your health care provider.  · If you were prescribed an antibiotic medicine, take it as told by your health care provider. Do not stop taking the antibiotic even if you start to feel better.  Contact a health care provider if:  · You have a fever.  · You develop pain in your back or sides.  · You have nausea or vomiting.  · You have blood in your  urine.  · You are not urinating as often as you usually do.  Get help right away if:  · Your pain is severe and not relieved with medicines.  · You cannot eat or drink without vomiting.  · You are confused.  · You have a rapid heartbeat while at rest.  · You have shaking or chills.  · You feel extremely weak.  Summary  · Dysuria is pain or discomfort while urinating. Many different conditions can lead to dysuria.  · If you have dysuria, you may have to urinate frequently or have the sudden feeling that you have to urinate (urgency).  · Watch your condition for any changes. Keep all follow-up visits as told by your health care provider.  · Make sure that you urinate often and drink enough fluid to keep your urine pale yellow.  This information is not intended to replace advice given to you by your health care provider. Make sure you discuss any questions you have with your health care provider.  Document Released: 09/15/2005 Document Revised: 11/30/2018 Document Reviewed: 10/04/2018  [employer] Patient Education © 2020 [employer]  Flank Pain, Adult  Flank pain is pain in your side. The flank is the area of your side between your upper belly (abdomen) and your back. The pain may occur over a short time (acute), or it may be long-term or come back often (chronic). It may be mild or very bad. Pain in this area can be caused by many different things.  Follow these instructions at home:    · Drink enough fluid to keep your pee (urine) clear or pale yellow.  · Rest as told by your doctor.  · Take over-the-counter and prescription medicines only as told by your doctor.  · Keep a journal to keep track of:  ? What has caused your flank pain.  ? What has made it feel better.  · Keep all follow-up visits as told by your doctor. This is important.  Contact a doctor if:  · Medicine does not help your pain.  · You have new symptoms.  · Your pain gets worse.  · You have a fever.  · Your symptoms last longer than 2-3 days.  · You  have trouble peeing.  · You are peeing more often than normal.  Get help right away if:  · You have trouble breathing.  · You are short of breath.  · Your belly hurts, or it is swollen or red.  · You feel sick to your stomach (nauseous).  · You throw up (vomit).  · You feel like you will pass out, or you do pass out (faint).  · You have blood in your pee.  Summary  · Flank pain is pain in your side. The flank is the area of your side between your upper belly (abdomen) and your back.  · Flank pain may occur over a short time (acute), or it may be long-term or come back often (chronic). It may be mild or very bad.  · Pain in this area can be caused by many different things.  · Contact your doctor if your symptoms get worse or they last longer than 2-3 days.  This information is not intended to replace advice given to you by your health care provider. Make sure you discuss any questions you have with your health care provider.  Document Released: 09/26/2009 Document Revised: 11/30/2018 Document Reviewed: 04/09/2018  [employer] Patient Education © 2020 [employer]

## 2020-09-09 NOTE — PROGRESS NOTES
Chief Complaint:          Chief Complaint   Patient presents with   • Bilateral Flank Pain /Dysuria     Recurrent UTIs       HPI:   30 y.o. female.  Very pleasant female returns to clinic today for evaluation.  She is a medical assistant at [employer].  Reports testing her urine a few days ago showed lots of ketones and bilirubin.  It was very dark and adrian-colored.    Recently returned from vacation with concerns of lower abdominal pain and discomfort, bilateral flank pain, dysuria, and vaginal discharge.  She reports burning at the end of her stream, urinary frequency, and urgency.  She reports gross hematuria, and very dark adrian-colored urine.  She has had loss of appetite, reports nausea no vomiting, reports pressure and discomfort.  She denies chills or fevers, denies CVA tenderness.    She has a history of recurrent UTIs, she is currently on antibiotic suppressive therapy with Macrobid and uses Pyridium PRN for dysuria.  Her last urine culture was negative for any growth, her urine dipstick today is completely negative for any infection, it is negative for microscopic hematuria.  Has 1+ proteinuria, ketonuria, and 2+ bilirubinuria and is adrian-colored.    She has had a recent CT scan 3 months ago that showed faint bilateral nephrocalcinosis, and a questionable enlarged uterus might be causing her pain and discomfort.  Dr. Chapman had recommended she follow-up with OB/GYN, and her family physician, but she is still to do that.    She is a , been  for the last 12 years, with past medical history as listed below.      Past Medical History:        Past Medical History:   Diagnosis Date   • Anxiety    • Back pain    • Constipation    • Depression    • Endometriosis    • Heart murmur    • Heart murmur    • Infection, Shigella    • Migraines    • Ovarian cyst    • Pain     PELVIC   • PCOS (polycystic ovarian syndrome)    • Pelvic pain    • Wrist fracture        The following portions of  the patient's history were reviewed and updated as appropriate: allergies, current medications, past family history, past medical history, past social history, past surgical history and problem list.    Current Meds:     Current Outpatient Medications   Medication Sig Dispense Refill   • escitalopram (LEXAPRO) 20 MG tablet Take 20 mg by mouth Daily.     • linaclotide (LINZESS) 290 MCG capsule capsule Take 290 mcg by mouth Every Morning Before Breakfast.     • nitrofurantoin, macrocrystal-monohydrate, (Macrobid) 100 MG capsule Take 1 capsule by mouth Daily. 56 capsule 3   • ondansetron (ZOFRAN) 4 MG tablet      • phenazopyridine (Pyridium) 200 MG tablet Take 1 tablet by mouth 3 (Three) Times a Day As Needed for Bladder Spasms. 20 tablet 0   • traZODone (DESYREL) 100 MG tablet Take 100 mg by mouth Every Night.     • etodolac (LODINE) 400 MG tablet Take 1 tablet by mouth 2 (Two) Times a Day. 20 tablet 1   • metFORMIN (GLUCOPHAGE) 500 MG tablet Take 500 mg by mouth 2 (Two) Times a Day With Meals.     • topiramate (TOPAMAX) 50 MG tablet Take 50 mg by mouth 2 (Two) Times a Day.       No current facility-administered medications for this visit.         Allergies:      No Known Allergies     Past Surgical History:     Past Surgical History:   Procedure Laterality Date   • ABDOMINAL SURGERY     • DIAGNOSTIC LAPAROSCOPY  2009   • DIAGNOSTIC LAPAROSCOPY N/A 7/6/2016    Procedure: DIAGNOSTIC LAPAROSCOPY IUD REMOVAL, FULGERATION OF ENDOMETRIOSIS;  Surgeon: Chapin Russell DO;  Location: [facility] OR;  Service:    • DIAGNOSTIC LAPAROSCOPY N/A 10/9/2019    Procedure: LAPAROSCOPY lysis of adhesions;  Surgeon: Chapin Russell DO;  Location: [facility];  Service: Obstetrics/Gynecology         Social History:     Social History     Socioeconomic History   • Marital status:      Spouse name: Not on file   • Number of children: Not on file   • Years of education: Not on file   • Highest education level: Not on file      Tobacco Use   • Smoking status: Never Smoker   • Smokeless tobacco: Never Used   Substance and Sexual Activity   • Alcohol use: No   • Drug use: No   • Sexual activity: Yes     Partners: Male     Birth control/protection: IUD       Family History:     Family History   Problem Relation Age of Onset   • No Known Problems Father    • No Known Problems Mother    • Diabetes Maternal Grandmother    • Kidney disease Maternal Grandfather    • Heart disease Maternal Grandfather        Review of Systems:     Review of Systems   Constitutional: Positive for activity change and fatigue. Negative for chills and fever.   Respiratory: Negative for chest tightness and shortness of breath.    Cardiovascular: Negative for chest pain.   Gastrointestinal: Positive for abdominal pain and nausea. Negative for vomiting.   Genitourinary: Positive for difficulty urinating, dysuria, flank pain, frequency, hematuria, pelvic pain, pelvic pressure and vaginal pain. Negative for urgency.   Musculoskeletal: Positive for back pain.   Allergic/Immunologic: Negative for environmental allergies and food allergies.   Neurological: Positive for light-headedness.   Hematological: Does not bruise/bleed easily.   Psychiatric/Behavioral: Positive for sleep disturbance and stress. The patient is not nervous/anxious.         Physical Exam:     Physical Exam   Constitutional: She is oriented to person, place, and time. She appears well-developed and well-nourished. She appears distressed.   HENT:   Head: Normocephalic and atraumatic.   Eyes: Pupils are equal, round, and reactive to light. EOM are normal.   Neck: Normal range of motion. No tracheal deviation present. No thyromegaly present.   Cardiovascular: Normal rate and regular rhythm.   No murmur heard.  Pulmonary/Chest: Effort normal and breath sounds normal. No stridor. No respiratory distress. She has no wheezes.   Abdominal: Soft. Bowel sounds are normal. There is tenderness.   Genitourinary: Vagina  normal. There is no tenderness on the right labia. There is no tenderness on the left labia. No vaginal discharge found.   Musculoskeletal: Normal range of motion. She exhibits tenderness. She exhibits no edema or deformity.   Neurological: She is alert and oriented to person, place, and time. No cranial nerve deficit or sensory deficit. Coordination normal.   Skin: Skin is warm and dry. Capillary refill takes less than 2 seconds. No rash noted. No erythema. No pallor.   Psychiatric: She has a normal mood and affect. Her behavior is normal. Judgment and thought content normal.       Procedure:       Assessment/Plan:   Bilateral Flank Pain/Dysuria/Recurrent UTI/Hematuria: Very pleasant lady evaluated in clinic today for bilateral flank pain, lower abdominal discomfort, dysuria, with other urinary tract symptoms of frequency, urgency, burning at end of her stream. Her urine dipstick is complete negative for any infection, she has trace proteinuria, trace ketonuria, 2+ bilirubinemia    She is symptomatic today and reports NOT feeling well. We discussed the types of organisms that are found in the urinary tract indicating that the vast majority are results of the patient's own gastrointestinal isa.  We discussed how many of the antibiotics that are utilized can actually exacerbate these infections by creating resistant organisms and there is only a very few antibiotics that are concentrated in the urine and do not affect the rectal reservoir nor cause recurrent yeast vaginitis.      We discussed the risk factors for recurrent infections being intercourse in younger patients and atrophic changes in older patients.  We discussed the symptoms that are found including pain, pressure, burning, frequency, urgency suprapubic pain . I discussed upper tract symptoms including fevers, chills, and indicated the workup would be much more aggressive if the patient were to present with recurrent infections in the face of upper  tract symptomatology such as fever.     We also Discussed Microscopic VS Gross Hematuria with patient. She has been very symptomatic and has some gross hematuria last episode 2 days ago. We discussed the possible causes such as infection in the bladder, kidney, or bladder discomfort, trauma. vigorous exercise, viral illness, such as hepatitis a virus that causes liver disease and inflammation of the liver.    We will send her urine for culture today, PCR/STI extensive testing.  I will call her with results    Start Etodolac 400 mg twice daily as needed bilateral flank pain/also recommend warm compresses as tolerated to flank area as tolerated.    Strongly recommend she Continue her antibiotic prophylaxis with Macrobid daily.     I recommend concomitant probiotics with treatment with antibiotics to protect the rectal reservoir including over-the-counter yogurt preparations to brenna oral pills containing the appropriate probiotics.    Also increase p.o. fluid intake to at least 2 to 3 L daily    Recommend follow-up with OB/GYN for ovarian cyst/questionable  enlarged uterus as seen on last CT.    We will see her back in 2 weeks for follow-up, review STI results at that time.    Patient is agreeable to plan of care    Patient reports that she is not currently experiencing any symptoms of urinary incontinence.    Patient's Body mass index is 26.28 kg/m². BMI is above normal parameters. Recommendations include: educational material, exercise counseling and nutrition counseling.    Smoking Cessation Counseling:  Never a smoker.  Patient does not currently use any tobacco products.     Counseling was given to patient for the following topics diagnostic results including: Bilateral flank pain, dysuria, recurrent UTIs, gross hematuria., instructions for management as follows: Increase p.o. fluid intake 2 to 3 L daily, add Toradol 400 mg twice daily as needed, warm compresses to flank area, continue Macrobid prophylaxis,  Pyridium as needed, risk factor reductions including: Avoiding bladder irritants such as caffeine products, coffee, teas, citrusy foods, spicy foods. and impressions as follows: Urine culture, micro Gene testing, STI testing, Discussed IC smart diet. The interim medical history and current results were reviewed.  A treatment plan with follow-up was made for bilateral flank pain, dysuria, hematuria, Recurrent UTI [N39.0].       This document has been electronically signed by Griselda Cheng-Akwa, APRN September 9, 2020 09:22

## 2020-09-10 LAB — BACTERIA SPEC AEROBE CULT: NO GROWTH

## 2020-09-15 ENCOUNTER — TELEPHONE (OUTPATIENT)
Dept: UROLOGY | Facility: CLINIC | Age: 30
End: 2020-09-15

## 2020-09-15 NOTE — TELEPHONE ENCOUNTER
Called patient to check on her since her last clinic visit and to discuss her urine culture results were negative. She may call with any questions.  filiberto Valentine

## 2020-09-22 ENCOUNTER — OFFICE VISIT (OUTPATIENT)
Dept: UROLOGY | Facility: CLINIC | Age: 30
End: 2020-09-22

## 2020-09-22 ENCOUNTER — PREP FOR SURGERY (OUTPATIENT)
Dept: OTHER | Facility: HOSPITAL | Age: 30
End: 2020-09-22

## 2020-09-22 VITALS — HEIGHT: 66 IN | TEMPERATURE: 97.9 F | BODY MASS INDEX: 25.39 KG/M2 | WEIGHT: 158 LBS

## 2020-09-22 DIAGNOSIS — N30.10 IC (INTERSTITIAL CYSTITIS): Primary | ICD-10-CM

## 2020-09-22 DIAGNOSIS — R35.0 FREQUENCY OF MICTURITION: ICD-10-CM

## 2020-09-22 DIAGNOSIS — N39.0 RECURRENT UTI: ICD-10-CM

## 2020-09-22 DIAGNOSIS — N30.10 INTERSTITIAL CYSTITIS: ICD-10-CM

## 2020-09-22 LAB
BILIRUB BLD-MCNC: ABNORMAL MG/DL
CLARITY, POC: CLEAR
COLOR UR: YELLOW
GLUCOSE UR STRIP-MCNC: NEGATIVE MG/DL
KETONES UR QL: NEGATIVE
LEUKOCYTE EST, POC: NEGATIVE
NITRITE UR-MCNC: NEGATIVE MG/ML
PH UR: 5.5 [PH] (ref 5–8)
PROT UR STRIP-MCNC: ABNORMAL MG/DL
RBC # UR STRIP: NEGATIVE /UL
SP GR UR: 1.03 (ref 1–1.03)
UROBILINOGEN UR QL: NORMAL

## 2020-09-22 PROCEDURE — 87086 URINE CULTURE/COLONY COUNT: CPT | Performed by: NURSE PRACTITIONER

## 2020-09-22 PROCEDURE — 99024 POSTOP FOLLOW-UP VISIT: CPT | Performed by: NURSE PRACTITIONER

## 2020-09-22 RX ORDER — GENTAMICIN SULFATE 80 MG/100ML
80 INJECTION, SOLUTION INTRAVENOUS ONCE
Status: CANCELLED | OUTPATIENT
Start: 2020-09-22

## 2020-09-22 RX ORDER — TRAZODONE HYDROCHLORIDE 150 MG/1
150 TABLET ORAL NIGHTLY
COMMUNITY
Start: 2020-08-25

## 2020-09-23 PROBLEM — N30.10 INTERSTITIAL CYSTITIS: Status: ACTIVE | Noted: 2020-09-23

## 2020-09-23 LAB — BACTERIA SPEC AEROBE CULT: NO GROWTH

## 2020-09-30 DIAGNOSIS — N30.10 IC (INTERSTITIAL CYSTITIS): Primary | ICD-10-CM

## 2020-10-09 ENCOUNTER — APPOINTMENT (OUTPATIENT)
Dept: PREADMISSION TESTING | Facility: HOSPITAL | Age: 30
End: 2020-10-09

## 2020-10-19 ENCOUNTER — APPOINTMENT (OUTPATIENT)
Dept: PREADMISSION TESTING | Facility: HOSPITAL | Age: 30
End: 2020-10-19

## 2020-10-19 ENCOUNTER — LAB (OUTPATIENT)
Dept: LAB | Facility: HOSPITAL | Age: 30
End: 2020-10-19

## 2020-10-19 DIAGNOSIS — N30.10 IC (INTERSTITIAL CYSTITIS): ICD-10-CM

## 2020-10-19 PROCEDURE — U0004 COV-19 TEST NON-CDC HGH THRU: HCPCS | Performed by: UROLOGY

## 2020-10-19 PROCEDURE — C9803 HOPD COVID-19 SPEC COLLECT: HCPCS

## 2020-10-19 NOTE — DISCHARGE INSTRUCTIONS
TAKE the following medications the morning of surgery:    All heart or blood pressure medications    Please discontinue all blood thinners and anticoagulants (except aspirin) prior to surgery as per your surgeon and cardiologist instructions.  Aspirin may be continued up to the day prior to surgery.    HOLD all diabetic medications the morning of surgery as order by physician.    Arrival time for surgery on 10/21/20 will be given to you by Dr. Baum's office.    A RESPONSIBLE PERSON MUST REMAIN IN THE WAITING ROOM DURING YOUR PROCEDURE AND A RESPONSIBLE  MUST BE AVAILABLE UPON YOUR DISCHARGE.    General Instructions:  • Do NOT eat or drink after midnight 10/20/20 which includes water, mints, or gum.  • You may brush your teeth. Dental appliances that are removable must be taken out day of surgery.  • Do NOT smoke, chew tobacco, or drink alcohol within 24 hours prior to surgery.  • Bring medications in original bottles, any inhalers and if applicable your C-PAP/BI-PAP machine  • Bring any papers given to you in the doctor’s office  • Wear clean, comfortable clothes and socks  • Do NOT wear contact lenses or make-up or dark nail polish.  Bring a case for your glasses if applicable.  • Bring crutches or walker if applicable  • Leave all other valuables and jewelry at home  • If you were given a blood bank armband, continue to wear it until discharged.    Preventing a Surgical Site Infection:  • Shower the night before surgery (unless instructed otherwise) using a fresh bar of anti-bacterial soap (such as Dial) and clean washcloth.  Dry with a clean towel and dress in clean clothing.  • For 2 to 3 days before surgery, avoid shaving with a razor near where you will have surgery because the razor can irritate skin and make it easier to develop an infection.  Ask your surgeon if you will be receiving antibiotics prior to surgery.  • Make sure you, your family, and all healthcare providers clean their hands with  soap and water or an alcohol-based hand  before caring for you or your wound.  • If at all possible, quit smoking as many days before surgery as you can.    Day of Surgery:  Upon arrival, a pre-op nurse and anesthesiologist will review your health history, obtain vital signs, and answer questions you may have.  The only belongings needed at this time will be your home medications and if applicable you C-PAP/BI-PAP machine.  If you are staying overnight, your family can leave the rest of your belongings in the car and bring them to your room later.  A pre-op nurse will start an IV and you may receive medication in preparation for surgery.  Due to patient privacy and limited space, only one member of your family will be able to accompany you in the pre-op area.  While you are in surgery your family should notify the waiting room  if they leave the waiting room area and provide a contact number.  Please be aware that surgery does come with discomfort.  We want to make every effort to control your discomfort so please discuss any uncontrolled symptoms with your nurse.  Your doctor will most likely have prescribed pain medications.  If you are going home after surgery you will receive individualized written care instructions before being discharged.  A responsible adult must drive you to and from the hospital on the day of surgery and stay with you for 24 hours.  If you are staying overnight following surgery, you will be transported to your hospital room following the recovery period.

## 2020-10-20 LAB — SARS-COV-2 RNA RESP QL NAA+PROBE: NOT DETECTED

## 2020-10-21 ENCOUNTER — APPOINTMENT (OUTPATIENT)
Dept: GENERAL RADIOLOGY | Facility: HOSPITAL | Age: 30
End: 2020-10-21

## 2020-10-21 ENCOUNTER — ANESTHESIA EVENT (OUTPATIENT)
Dept: PERIOP | Facility: HOSPITAL | Age: 30
End: 2020-10-21

## 2020-10-21 ENCOUNTER — HOSPITAL ENCOUNTER (OUTPATIENT)
Facility: HOSPITAL | Age: 30
Setting detail: HOSPITAL OUTPATIENT SURGERY
Discharge: HOME | End: 2020-10-21
Attending: UROLOGY | Admitting: UROLOGY

## 2020-10-21 ENCOUNTER — ANESTHESIA (OUTPATIENT)
Dept: PERIOP | Facility: HOSPITAL | Age: 30
End: 2020-10-21

## 2020-10-21 VITALS
DIASTOLIC BLOOD PRESSURE: 68 MMHG | RESPIRATION RATE: 18 BRPM | TEMPERATURE: 97.8 F | BODY MASS INDEX: 24.91 KG/M2 | SYSTOLIC BLOOD PRESSURE: 110 MMHG | HEART RATE: 83 BPM | WEIGHT: 155 LBS | OXYGEN SATURATION: 99 % | HEIGHT: 66 IN

## 2020-10-21 DIAGNOSIS — N30.10 INTERSTITIAL CYSTITIS: ICD-10-CM

## 2020-10-21 PROCEDURE — 81025 URINE PREGNANCY TEST: CPT | Performed by: ANESTHESIOLOGY

## 2020-10-21 PROCEDURE — 52260 CYSTOSCOPY AND TREATMENT: CPT | Performed by: UROLOGY

## 2020-10-21 PROCEDURE — 25010000002 PROPOFOL 10 MG/ML EMULSION: Performed by: NURSE ANESTHETIST, CERTIFIED REGISTERED

## 2020-10-21 PROCEDURE — 25010000002 FENTANYL CITRATE (PF) 100 MCG/2ML SOLUTION: Performed by: NURSE ANESTHETIST, CERTIFIED REGISTERED

## 2020-10-21 PROCEDURE — 25010000002 GENTAMICIN PER 80 MG: Performed by: NURSE PRACTITIONER

## 2020-10-21 PROCEDURE — 25010000002 MIDAZOLAM PER 1 MG: Performed by: NURSE ANESTHETIST, CERTIFIED REGISTERED

## 2020-10-21 RX ORDER — FENTANYL CITRATE 50 UG/ML
50 INJECTION, SOLUTION INTRAMUSCULAR; INTRAVENOUS
Status: DISCONTINUED | OUTPATIENT
Start: 2020-10-21 | End: 2020-10-21 | Stop reason: HOSPADM

## 2020-10-21 RX ORDER — PROPOFOL 10 MG/ML
VIAL (ML) INTRAVENOUS AS NEEDED
Status: DISCONTINUED | OUTPATIENT
Start: 2020-10-21 | End: 2020-10-21 | Stop reason: SURG

## 2020-10-21 RX ORDER — ATROPA BELLADONNA AND OPIUM 16.2; 3 MG/1; MG/1
SUPPOSITORY RECTAL AS NEEDED
Status: DISCONTINUED | OUTPATIENT
Start: 2020-10-21 | End: 2020-10-21 | Stop reason: HOSPADM

## 2020-10-21 RX ORDER — ONDANSETRON 2 MG/ML
4 INJECTION INTRAMUSCULAR; INTRAVENOUS AS NEEDED
Status: DISCONTINUED | OUTPATIENT
Start: 2020-10-21 | End: 2020-10-21 | Stop reason: HOSPADM

## 2020-10-21 RX ORDER — HYDROCODONE BITARTRATE AND ACETAMINOPHEN 10; 325 MG/1; MG/1
1 TABLET ORAL EVERY 4 HOURS PRN
Qty: 12 TABLET | Refills: 0 | OUTPATIENT
Start: 2020-10-21 | End: 2020-12-10

## 2020-10-21 RX ORDER — MEPERIDINE HYDROCHLORIDE 25 MG/ML
12.5 INJECTION INTRAMUSCULAR; INTRAVENOUS; SUBCUTANEOUS
Status: DISCONTINUED | OUTPATIENT
Start: 2020-10-21 | End: 2020-10-21 | Stop reason: HOSPADM

## 2020-10-21 RX ORDER — GENTAMICIN SULFATE 80 MG/100ML
80 INJECTION, SOLUTION INTRAVENOUS ONCE
Status: COMPLETED | OUTPATIENT
Start: 2020-10-21 | End: 2020-10-21

## 2020-10-21 RX ORDER — FENTANYL CITRATE 50 UG/ML
INJECTION, SOLUTION INTRAMUSCULAR; INTRAVENOUS AS NEEDED
Status: DISCONTINUED | OUTPATIENT
Start: 2020-10-21 | End: 2020-10-21 | Stop reason: SURG

## 2020-10-21 RX ORDER — OXYCODONE HYDROCHLORIDE AND ACETAMINOPHEN 5; 325 MG/1; MG/1
1 TABLET ORAL ONCE AS NEEDED
Status: DISCONTINUED | OUTPATIENT
Start: 2020-10-21 | End: 2020-10-21 | Stop reason: HOSPADM

## 2020-10-21 RX ORDER — SODIUM CHLORIDE 0.9 % (FLUSH) 0.9 %
10 SYRINGE (ML) INJECTION AS NEEDED
Status: DISCONTINUED | OUTPATIENT
Start: 2020-10-21 | End: 2020-10-21 | Stop reason: HOSPADM

## 2020-10-21 RX ORDER — SODIUM CHLORIDE 0.9 % (FLUSH) 0.9 %
10 SYRINGE (ML) INJECTION EVERY 12 HOURS SCHEDULED
Status: DISCONTINUED | OUTPATIENT
Start: 2020-10-21 | End: 2020-10-21 | Stop reason: HOSPADM

## 2020-10-21 RX ORDER — MIDAZOLAM HYDROCHLORIDE 1 MG/ML
1 INJECTION INTRAMUSCULAR; INTRAVENOUS
Status: DISCONTINUED | OUTPATIENT
Start: 2020-10-21 | End: 2020-10-21 | Stop reason: HOSPADM

## 2020-10-21 RX ORDER — IPRATROPIUM BROMIDE AND ALBUTEROL SULFATE 2.5; .5 MG/3ML; MG/3ML
3 SOLUTION RESPIRATORY (INHALATION) ONCE AS NEEDED
Status: DISCONTINUED | OUTPATIENT
Start: 2020-10-21 | End: 2020-10-21 | Stop reason: HOSPADM

## 2020-10-21 RX ORDER — SODIUM CHLORIDE, SODIUM LACTATE, POTASSIUM CHLORIDE, CALCIUM CHLORIDE 600; 310; 30; 20 MG/100ML; MG/100ML; MG/100ML; MG/100ML
125 INJECTION, SOLUTION INTRAVENOUS CONTINUOUS
Status: DISCONTINUED | OUTPATIENT
Start: 2020-10-21 | End: 2020-10-21 | Stop reason: HOSPADM

## 2020-10-21 RX ORDER — MAGNESIUM HYDROXIDE 1200 MG/15ML
LIQUID ORAL AS NEEDED
Status: DISCONTINUED | OUTPATIENT
Start: 2020-10-21 | End: 2020-10-21 | Stop reason: HOSPADM

## 2020-10-21 RX ORDER — MIDAZOLAM HYDROCHLORIDE 1 MG/ML
INJECTION INTRAMUSCULAR; INTRAVENOUS AS NEEDED
Status: DISCONTINUED | OUTPATIENT
Start: 2020-10-21 | End: 2020-10-21 | Stop reason: SURG

## 2020-10-21 RX ADMIN — PROPOFOL 50 MG: 10 INJECTION, EMULSION INTRAVENOUS at 08:09

## 2020-10-21 RX ADMIN — GENTAMICIN SULFATE 80 MG: 80 INJECTION, SOLUTION INTRAVENOUS at 08:07

## 2020-10-21 RX ADMIN — FENTANYL CITRATE 100 MCG: 50 INJECTION INTRAMUSCULAR; INTRAVENOUS at 08:07

## 2020-10-21 RX ADMIN — PROPOFOL 160 MCG/KG/MIN: 10 INJECTION, EMULSION INTRAVENOUS at 08:09

## 2020-10-21 RX ADMIN — MIDAZOLAM HYDROCHLORIDE 2 MG: 1 INJECTION, SOLUTION INTRAMUSCULAR; INTRAVENOUS at 08:07

## 2020-10-21 RX ADMIN — SODIUM CHLORIDE, POTASSIUM CHLORIDE, SODIUM LACTATE AND CALCIUM CHLORIDE 125 ML/HR: 600; 310; 30; 20 INJECTION, SOLUTION INTRAVENOUS at 07:41

## 2020-10-21 NOTE — OP NOTE
CYSTOSCOPY BLADDER HYDRODISTENSION  Procedure Note    Latisha Pedraza  10/21/2020    Pre-op Diagnosis:   Interstitial cystitis [N30.10]    Post-op Diagnosis:     Post-Op Diagnosis Codes:     * Interstitial cystitis [N30.10]    Procedure/CPT® Codes:    30-year-old white female with signs and symptoms compatible with interstitial cystitis.  Cystoscopy and hydraulic distention of the urinary bladder.  Patient was given an informed consent including the risk of anesthesia, bleeding, infection, perforation or rupture of the urinary bladder as well as lack of efficacy in the procedure.  Following an extensive informed consent the patient was brought to the operative suite whereupon she underwent induction of general anesthesia and was placed on the low dorsolithotomy position.  The urethra was inspected and a 21 Tongan cystoscope was advanced into the urinary bladder showing normal orthotopic orifices easily position configuration.  The bladder was gently dilated at 50 cc/m using spinal manometric technique and carried out to over 700  cc.  Obvious glomerulations were noted.  The patient's capacity was noted to be 900 cc.  I then repeated the procedure ×2 and noted a bloody effluent of urine from the bladder.  Consistent with an excellent treatment.  The patient was given antibiotics perioperatively and a B&O suppository was used for postop spasms.  Procedure(s):  CYSTOSCOPY BLADDER HYDRODISTENSION    Surgeon(s):  Reji Baum MD    Anesthesia: see anesthesia record    Staff:   Circulator: Colin Landis RN  Scrub Person: Nita Lara  Documenter: Hayder Hubbard RN  Other: Cara Hector    Estimated Blood Loss: none  Urine Voided: * No values recorded between 10/21/2020  8:08 AM and 10/21/2020  8:26 AM *    Specimens:                None      Drains: None    Findings: Classic severe interstitial cystitis    Blood: N/A    Complications: None    Grafts and Implants: None    Reji Baum  MD     Date: 10/21/2020  Time: 08:35 EDT

## 2020-10-21 NOTE — ANESTHESIA PREPROCEDURE EVALUATION
Anesthesia Evaluation     Patient summary reviewed and Nursing notes reviewed   no history of anesthetic complications:  NPO Solid Status: > 8 hours  NPO Liquid Status: > 8 hours           Airway   Mallampati: II  TM distance: >3 FB  Neck ROM: full  no difficulty expected  Dental - normal exam     Pulmonary - normal exam   (+) pleural effusion,   (-) asthma, not a smoker  Cardiovascular - normal exam  Exercise tolerance: good (4-7 METS)    NYHA Classification: I    (+) valvular problems/murmurs murmur,   (-) dysrhythmias, angina, CHF      Neuro/Psych  (+) headaches, psychiatric history Depression,     (-) seizures  GI/Hepatic/Renal/Endo    (+)   renal disease,   (-) diabetes    Musculoskeletal     (+) back pain,   Abdominal  - normal exam    Bowel sounds: normal.   Substance History - negative use     OB/GYN negative ob/gyn ROS   (-)  Pregnant        Other - negative ROS       ROS/Med Hx Other: Hx PCOS                    Anesthesia Plan    ASA 2     general     intravenous induction     Anesthetic plan, all risks, benefits, and alternatives have been provided, discussed and informed consent has been obtained with: patient.    Plan discussed with CRNA.

## 2020-10-21 NOTE — ANESTHESIA POSTPROCEDURE EVALUATION
Patient: Latisha Pedraza    Procedure Summary     Date: 10/21/20 Room / Location: [facility] [address] [place] [address] [facility] OR    Anesthesia Start: 0807 Anesthesia Stop: 0826    Procedure: CYSTOSCOPY BLADDER HYDRODISTENSION (N/A ) Diagnosis:       Interstitial cystitis      (Interstitial cystitis [N30.10])    Surgeon: Reji Baum MD Provider: Alvin Carvalho MD    Anesthesia Type: general ASA Status: 2          Anesthesia Type: general    Vitals  Vitals Value Taken Time   /79 10/21/20 0842   Temp 97 °F (36.1 °C) 10/21/20 0827   Pulse 92 10/21/20 0842   Resp 18 10/21/20 0842   SpO2 98 % 10/21/20 0842           Post Anesthesia Care and Evaluation    Patient location during evaluation: bedside  Patient participation: complete - patient participated  Level of consciousness: awake and alert  Pain score: 1  Pain management: adequate  Airway patency: patent  Anesthetic complications: No anesthetic complications  PONV Status: none  Cardiovascular status: acceptable  Respiratory status: acceptable  Hydration status: acceptable

## 2020-10-22 ENCOUNTER — TELEPHONE (OUTPATIENT)
Dept: UROLOGY | Facility: CLINIC | Age: 30
End: 2020-10-22

## 2020-10-22 NOTE — TELEPHONE ENCOUNTER
Patient wants to know when she is allowed to go back to work. She needs a letter faxed to her work at [phone].

## 2020-10-22 NOTE — TELEPHONE ENCOUNTER
Called patient back to let her know she can go back to work Monday per Serge. Route to [facility] so she can get the letter and fax.

## 2020-11-03 DIAGNOSIS — N39.0 RECURRENT UTI: Primary | ICD-10-CM

## 2020-11-03 RX ORDER — NITROFURANTOIN 25; 75 MG/1; MG/1
100 CAPSULE ORAL 2 TIMES DAILY
Qty: 20 CAPSULE | Refills: 3 | Status: SHIPPED | OUTPATIENT
Start: 2020-11-03 | End: 2020-11-13

## 2020-11-03 NOTE — TELEPHONE ENCOUNTER
Patient called stating that she is having painful urination and she ran her urine at work and it looked like she had a pretty bad infection. She stated that she had taken Azo before she ran her urine. She has an appointment on Thursday for post op. She is requesting a call back. Thank you.

## 2020-11-12 ENCOUNTER — OFFICE VISIT (OUTPATIENT)
Dept: UROLOGY | Facility: CLINIC | Age: 30
End: 2020-11-12

## 2020-11-12 VITALS — TEMPERATURE: 99.3 F | BODY MASS INDEX: 25.07 KG/M2 | HEIGHT: 66 IN | WEIGHT: 156 LBS

## 2020-11-12 DIAGNOSIS — N39.0 RECURRENT UTI: Primary | ICD-10-CM

## 2020-11-12 DIAGNOSIS — N30.10 CHRONIC INTERSTITIAL CYSTITIS: ICD-10-CM

## 2020-11-12 PROCEDURE — 96372 THER/PROPH/DIAG INJ SC/IM: CPT | Performed by: NURSE PRACTITIONER

## 2020-11-12 PROCEDURE — 87086 URINE CULTURE/COLONY COUNT: CPT | Performed by: NURSE PRACTITIONER

## 2020-11-12 PROCEDURE — 99024 POSTOP FOLLOW-UP VISIT: CPT | Performed by: NURSE PRACTITIONER

## 2020-11-12 RX ORDER — GENTAMICIN SULFATE 40 MG/ML
80 INJECTION, SOLUTION INTRAMUSCULAR; INTRAVENOUS ONCE
Status: DISCONTINUED | OUTPATIENT
Start: 2020-11-12 | End: 2020-11-12

## 2020-11-12 RX ORDER — GENTAMICIN SULFATE 40 MG/ML
80 INJECTION, SOLUTION INTRAMUSCULAR; INTRAVENOUS ONCE
Status: COMPLETED | OUTPATIENT
Start: 2020-11-12 | End: 2020-11-12

## 2020-11-12 RX ADMIN — GENTAMICIN SULFATE 80 MG: 40 INJECTION, SOLUTION INTRAMUSCULAR; INTRAVENOUS at 11:36

## 2020-11-12 NOTE — PROGRESS NOTES
Chief Complaint:          Chief Complaint   Patient presents with   • Intersticial Cystitis     surgery fu Post Bladder Hydrodistention       HPI:   30 y.o. female.  Very pleasant patient  Returns to clinic  for follow up today. She had Cystoscopy    bladder hydrodistention secondary to severe ongoing urinary symptoms of frequency, urgency, dysuria, lower back pain, urinary discomfort, with significant bladder pain and discomfort with urinary symptoms consistent with interstitial cystitis.    She reports doing relatively well post procedure and denies having any post Opt complications from the procedure such as some blood staining or her urine, and significant stinging when voiding. She denies any severe cramping, urinary retention , fevers, chills or uncontrolled N/V/D.    However she reports urinary symptoms today of frequency, urgency, and flank pain consistent with her prior UTIs ongoing for 3 days.  She reports taking some Pyridium for relief, hence we will send her urine out today for culture.      Past Medical History:        Past Medical History:   Diagnosis Date   • Anxiety    • Back pain    • Constipation    • Depression    • Endometriosis    • Heart murmur    • Heart murmur    • Infection, Shigella 2005   • Migraines    • Ovarian cyst    • Pain     PELVIC   • PCOS (polycystic ovarian syndrome)    • Pelvic pain    • Wrist fracture    The following portions of the patient's history were reviewed and updated as appropriate: allergies, current medications, past family history, past medical history, past social history, past surgical history and problem list.    Current Meds:     Current Outpatient Medications   Medication Sig Dispense Refill   • escitalopram (LEXAPRO) 20 MG tablet Take 20 mg by mouth Daily.     • etodolac (LODINE) 400 MG tablet Take 1 tablet by mouth 2 (Two) Times a Day. 20 tablet 1   • HYDROcodone-acetaminophen (NORCO)  MG per tablet Take 1 tablet by mouth Every 4 (Four) Hours As  Needed for Moderate Pain  (Pain). 12 tablet 0   • linaclotide (LINZESS) 290 MCG capsule capsule Take 290 mcg by mouth Every Morning Before Breakfast.     • nitrofurantoin, macrocrystal-monohydrate, (MACROBID) 100 MG capsule Take 1 capsule by mouth 2 (Two) Times a Day for 10 days. 20 capsule 3   • ondansetron (ZOFRAN) 4 MG tablet Take 4 mg by mouth Every 12 (Twelve) Hours As Needed.     • phenazopyridine (Pyridium) 200 MG tablet Take 1 tablet by mouth 3 (Three) Times a Day As Needed for Bladder Spasms. 20 tablet 0   • traZODone (DESYREL) 150 MG tablet Take 150 mg by mouth Every Night.       No current facility-administered medications for this visit.         Allergies:      No Known Allergies     Past Surgical History:     Past Surgical History:   Procedure Laterality Date   • ABDOMINAL SURGERY     •  SECTION     • CYSTOSCOPY BLADDER HYDRODISTENSION N/A 10/21/2020    Procedure: CYSTOSCOPY BLADDER HYDRODISTENSION;  Surgeon: Reji Baum MD;  Location: [facility];  Service: Urology;  Laterality: N/A;   • DIAGNOSTIC LAPAROSCOPY     • DIAGNOSTIC LAPAROSCOPY N/A 2016    Procedure: DIAGNOSTIC LAPAROSCOPY IUD REMOVAL, FULGERATION OF ENDOMETRIOSIS;  Surgeon: Chapin Russell DO;  Location: [facility];  Service:    • DIAGNOSTIC LAPAROSCOPY N/A 10/9/2019    Procedure: LAPAROSCOPY lysis of adhesions;  Surgeon: Chapin Russell DO;  Location: [facility];  Service: Obstetrics/Gynecology         Social History:     Social History     Socioeconomic History   • Marital status:      Spouse name: Not on file   • Number of children: Not on file   • Years of education: Not on file   • Highest education level: Not on file   Tobacco Use   • Smoking status: Never Smoker   • Smokeless tobacco: Never Used   Substance and Sexual Activity   • Alcohol use: No   • Drug use: No   • Sexual activity: Yes     Partners: Male     Birth control/protection: I.U.D.       Family History:     Family History      Problem Relation Age of Onset   • No Known Problems Father    • No Known Problems Mother    • Diabetes Maternal Grandmother    • Kidney disease Maternal Grandfather    • Heart disease Maternal Grandfather        Review of Systems:     Review of Systems   Constitutional: Positive for activity change, chills and fatigue. Negative for fever.   HENT: Negative for congestion.    Eyes: Negative for blurred vision.   Gastrointestinal: Positive for abdominal pain and nausea. Negative for vomiting.   Genitourinary: Positive for dyspareunia, dysuria, flank pain, frequency, hematuria, pelvic pain, pelvic pressure and urgency. Negative for difficulty urinating, genital sores, urinary incontinence and vaginal discharge.   Musculoskeletal: Positive for back pain.   Neurological: Negative for dizziness, headache and confusion.   Psychiatric/Behavioral: Positive for stress. Negative for behavioral problems and decreased concentration.        Physical Exam:     Physical Exam  Constitutional:       General: She is not in acute distress.     Appearance: She is well-developed.   HENT:      Head: Normocephalic and atraumatic.   Eyes:      Pupils: Pupils are equal, round, and reactive to light.   Neck:      Musculoskeletal: Normal range of motion.      Thyroid: No thyromegaly.      Trachea: No tracheal deviation.   Cardiovascular:      Rate and Rhythm: Normal rate and regular rhythm.      Heart sounds: No murmur.   Pulmonary:      Effort: Pulmonary effort is normal. No respiratory distress.      Breath sounds: Normal breath sounds. No stridor. No wheezing.   Abdominal:      General: Bowel sounds are normal.      Palpations: Abdomen is soft.      Tenderness: There is abdominal tenderness.   Genitourinary:     Labia:         Right: No tenderness.         Left: No tenderness.       Vagina: Normal. No vaginal discharge.   Musculoskeletal: Normal range of motion.         General: Tenderness present. No deformity.   Skin:     General: Skin  is warm and dry.      Capillary Refill: Capillary refill takes less than 2 seconds.      Coloration: Skin is not pale.      Findings: No erythema or rash.   Neurological:      Mental Status: She is alert and oriented to person, place, and time.      Cranial Nerves: No cranial nerve deficit.      Sensory: No sensory deficit.      Coordination: Coordination normal.   Psychiatric:         Behavior: Behavior normal.         Thought Content: Thought content normal.         Judgment: Judgment normal.         Procedure:       Assessment/Plan:     Interstitial Cystitis: Post  Opt Bladder Hydrodistention/Recurrent UTIs : Patient is seen for follow-up.  She had an  anesthetic cystoscopy with bladder hydrodistention secondary to significant interstitial cystitis on 10/21/2020 by Dr. Uriostegui.  Patient reports doing relatively well, with no postop complications until recently.    Nevertheless, she reports urinary symptoms of frequency, urgency, and dysuria x3 days which is becoming bothersome to her.  Again, we discussed the diagnosis and management of this condition.  We indicated that it was a multifactorial condition with multifactorial symptomatology.  Including frequency, urgency, the sensation of urinary tract infection in the absence of a positive culture, sexual symptomatology including significant dyspareunia.       We also discussed medical management including pharmacologic treatment such as amitriptyline, naturopathic treatments such as pumpkin oil and which more aggressive options of Botox injections as well as the relative risks and merits of this. We also discussed the use of dietary manipulation including the over-the-counter product relief which basically decreases the acid in the urine and avoidance of ascitic-containing foods such as citrus is etc.IC smart diet plan given to patient.      We also discussed the risk factors for recurrent infections being intercourse in younger patients and atrophic changes in  older patients.  We discussed the symptoms that are found including pain, pressure, burning, frequency, urgency suprapubic pain and painful intercourse. I discussed upper tract symptoms including fevers, chills, and indicated the workup would be much more aggressive if the patient were to present with recurrent infections in the face of upper tract symptomatology such as fever.      PLAN  We sent her urine for culture today, patient is on Pyridium I will call her with results if any bacterial growth not sensitive to current therapy.    Gentamicin 80 mg IM x1 dose given in clinic.     We discussed she continue her antibiotic prophylaxis as directed. She should increase her p.o. fluid intake to at least 1 to 2 L daily and avoid bladder irritants such as caffeine products, spicy foods, and citrusy foods.     I recommend concomitant probiotics with treatment with antibiotics to protect the rectal reservoir including over-the-counter yogurt preparations to brenna oral pills containing the appropriate probiotics. Patient reports the diligent use of Probiotics.    Zofran 0.4 mg as needed nausea    Follow-up in 6 weeks or sooner if symptoms worsen.    She is agreeable plan of care.    Patient reports that she is not currently experiencing any symptoms of urinary incontinence.    Patient's Body mass index is 25.18 kg/m². BMI is within normal parameters. No follow-up required..    Smoking Cessation Counseling:  Never a smoker.  Patient does not currently use any tobacco products.     Counseling was given to patient for the following topics diagnostic results including: Interstitial cystitis, recurrent UTIs., instructions for management as follows: Increase p.o. fluid intake, IC smart diet, continue Macrobid 100 mg daily, and risk factor reductions including: Bladder irritants such as caffeine products, citrusy/spicy foods. The interim medical history and current results were reviewed.  A treatment plan with follow-up was made  for IC,  Recurrent UTI [N39.0].          This document has been electronically signed by Griselda Cheng-Akwa, APRN November 12, 2020 13:30 EST

## 2020-11-13 LAB — BACTERIA SPEC AEROBE CULT: NO GROWTH

## 2020-12-10 ENCOUNTER — APPOINTMENT (OUTPATIENT)
Dept: GENERAL RADIOLOGY | Facility: HOSPITAL | Age: 30
End: 2020-12-10

## 2020-12-10 ENCOUNTER — HOSPITAL ENCOUNTER (EMERGENCY)
Facility: HOSPITAL | Age: 30
Discharge: HOME | End: 2020-12-10
Attending: FAMILY MEDICINE | Admitting: EMERGENCY MEDICINE

## 2020-12-10 VITALS
BODY MASS INDEX: 25.39 KG/M2 | WEIGHT: 158 LBS | HEART RATE: 104 BPM | HEIGHT: 66 IN | OXYGEN SATURATION: 98 % | RESPIRATION RATE: 18 BRPM | DIASTOLIC BLOOD PRESSURE: 74 MMHG | SYSTOLIC BLOOD PRESSURE: 103 MMHG | TEMPERATURE: 98.6 F

## 2020-12-10 DIAGNOSIS — J06.9 UPPER RESPIRATORY TRACT INFECTION, UNSPECIFIED TYPE: Primary | ICD-10-CM

## 2020-12-10 LAB
ALBUMIN SERPL-MCNC: 4.44 G/DL (ref 3.5–5.2)
ALBUMIN/GLOB SERPL: 1.6 G/DL
ALP SERPL-CCNC: 82 U/L (ref 39–117)
ALT SERPL W P-5'-P-CCNC: 8 U/L (ref 1–33)
ANION GAP SERPL CALCULATED.3IONS-SCNC: 4.2 MMOL/L (ref 5–15)
AST SERPL-CCNC: 13 U/L (ref 1–32)
BASOPHILS # BLD AUTO: 0.04 10*3/MM3 (ref 0–0.2)
BASOPHILS NFR BLD AUTO: 0.6 % (ref 0–1.5)
BILIRUB SERPL-MCNC: 0.6 MG/DL (ref 0–1.2)
BUN SERPL-MCNC: 9 MG/DL (ref 6–20)
BUN/CREAT SERPL: 10 (ref 7–25)
CALCIUM SPEC-SCNC: 9.3 MG/DL (ref 8.6–10.5)
CHLORIDE SERPL-SCNC: 103 MMOL/L (ref 98–107)
CO2 SERPL-SCNC: 26.8 MMOL/L (ref 22–29)
CREAT SERPL-MCNC: 0.9 MG/DL (ref 0.57–1)
CRP SERPL-MCNC: 0.19 MG/DL (ref 0–0.5)
D DIMER PPP FEU-MCNC: <0.27 MCGFEU/ML (ref 0–0.5)
DEPRECATED RDW RBC AUTO: 43.7 FL (ref 37–54)
EOSINOPHIL # BLD AUTO: 0.07 10*3/MM3 (ref 0–0.4)
EOSINOPHIL NFR BLD AUTO: 1 % (ref 0.3–6.2)
ERYTHROCYTE [DISTWIDTH] IN BLOOD BY AUTOMATED COUNT: 12.8 % (ref 12.3–15.4)
FERRITIN SERPL-MCNC: 65.38 NG/ML (ref 13–150)
FLUAV RNA RESP QL NAA+PROBE: NOT DETECTED
FLUBV RNA RESP QL NAA+PROBE: NOT DETECTED
GFR SERPL CREATININE-BSD FRML MDRD: 74 ML/MIN/1.73
GLOBULIN UR ELPH-MCNC: 2.8 GM/DL
GLUCOSE SERPL-MCNC: 101 MG/DL (ref 65–99)
HCT VFR BLD AUTO: 41.5 % (ref 34–46.6)
HGB BLD-MCNC: 13.2 G/DL (ref 12–15.9)
IMM GRANULOCYTES # BLD AUTO: 0.02 10*3/MM3 (ref 0–0.05)
IMM GRANULOCYTES NFR BLD AUTO: 0.3 % (ref 0–0.5)
LDH SERPL-CCNC: 138 U/L (ref 135–214)
LYMPHOCYTES # BLD AUTO: 1.88 10*3/MM3 (ref 0.7–3.1)
LYMPHOCYTES NFR BLD AUTO: 27.1 % (ref 19.6–45.3)
MCH RBC QN AUTO: 29.5 PG (ref 26.6–33)
MCHC RBC AUTO-ENTMCNC: 31.8 G/DL (ref 31.5–35.7)
MCV RBC AUTO: 92.6 FL (ref 79–97)
MONOCYTES # BLD AUTO: 0.43 10*3/MM3 (ref 0.1–0.9)
MONOCYTES NFR BLD AUTO: 6.2 % (ref 5–12)
NEUTROPHILS NFR BLD AUTO: 4.5 10*3/MM3 (ref 1.7–7)
NEUTROPHILS NFR BLD AUTO: 64.8 % (ref 42.7–76)
NRBC BLD AUTO-RTO: 0 /100 WBC (ref 0–0.2)
NT-PROBNP SERPL-MCNC: 13.4 PG/ML (ref 0–450)
PLATELET # BLD AUTO: 227 10*3/MM3 (ref 140–450)
PMV BLD AUTO: 10.7 FL (ref 6–12)
POTASSIUM SERPL-SCNC: 4 MMOL/L (ref 3.5–5.2)
PROT SERPL-MCNC: 7.2 G/DL (ref 6–8.5)
QT INTERVAL: 392 MS
QTC INTERVAL: 416 MS
RBC # BLD AUTO: 4.48 10*6/MM3 (ref 3.77–5.28)
SARS-COV-2 RNA RESP QL NAA+PROBE: NOT DETECTED
SODIUM SERPL-SCNC: 134 MMOL/L (ref 136–145)
WBC # BLD AUTO: 6.94 10*3/MM3 (ref 3.4–10.8)

## 2020-12-10 PROCEDURE — 83880 ASSAY OF NATRIURETIC PEPTIDE: CPT | Performed by: FAMILY MEDICINE

## 2020-12-10 PROCEDURE — 87636 SARSCOV2 & INF A&B AMP PRB: CPT | Performed by: FAMILY MEDICINE

## 2020-12-10 PROCEDURE — 71045 X-RAY EXAM CHEST 1 VIEW: CPT

## 2020-12-10 PROCEDURE — 85025 COMPLETE CBC W/AUTO DIFF WBC: CPT | Performed by: FAMILY MEDICINE

## 2020-12-10 PROCEDURE — 25010000002 DEXAMETHASONE PER 1 MG: Performed by: FAMILY MEDICINE

## 2020-12-10 PROCEDURE — 96374 THER/PROPH/DIAG INJ IV PUSH: CPT

## 2020-12-10 PROCEDURE — 85379 FIBRIN DEGRADATION QUANT: CPT | Performed by: FAMILY MEDICINE

## 2020-12-10 PROCEDURE — 99283 EMERGENCY DEPT VISIT LOW MDM: CPT

## 2020-12-10 PROCEDURE — 93005 ELECTROCARDIOGRAM TRACING: CPT | Performed by: FAMILY MEDICINE

## 2020-12-10 PROCEDURE — 83615 LACTATE (LD) (LDH) ENZYME: CPT | Performed by: FAMILY MEDICINE

## 2020-12-10 PROCEDURE — 71045 X-RAY EXAM CHEST 1 VIEW: CPT | Performed by: RADIOLOGY

## 2020-12-10 PROCEDURE — 82728 ASSAY OF FERRITIN: CPT | Performed by: FAMILY MEDICINE

## 2020-12-10 PROCEDURE — 86140 C-REACTIVE PROTEIN: CPT | Performed by: FAMILY MEDICINE

## 2020-12-10 PROCEDURE — 80053 COMPREHEN METABOLIC PANEL: CPT | Performed by: FAMILY MEDICINE

## 2020-12-10 PROCEDURE — 93010 ELECTROCARDIOGRAM REPORT: CPT | Performed by: INTERNAL MEDICINE

## 2020-12-10 RX ORDER — DEXAMETHASONE SODIUM PHOSPHATE 4 MG/ML
6 INJECTION, SOLUTION INTRA-ARTICULAR; INTRALESIONAL; INTRAMUSCULAR; INTRAVENOUS; SOFT TISSUE ONCE
Status: COMPLETED | OUTPATIENT
Start: 2020-12-10 | End: 2020-12-10

## 2020-12-10 RX ORDER — SODIUM CHLORIDE 0.9 % (FLUSH) 0.9 %
10 SYRINGE (ML) INJECTION AS NEEDED
Status: DISCONTINUED | OUTPATIENT
Start: 2020-12-10 | End: 2020-12-10 | Stop reason: HOSPADM

## 2020-12-10 RX ADMIN — DEXAMETHASONE SODIUM PHOSPHATE 6 MG: 4 INJECTION, SOLUTION INTRAMUSCULAR; INTRAVENOUS at 06:15

## 2020-12-10 RX ADMIN — SODIUM CHLORIDE 1000 ML: 9 INJECTION, SOLUTION INTRAVENOUS at 06:15

## 2020-12-10 NOTE — ED PROVIDER NOTES
Subjective   30-year-old white female with past medical history of anxiety, depression presents emergency department with reports of body aches chills cough congestion.  Patient reports that symptoms started initially on Tuesday evening and then last night around 10 PM hit her like a ton of bricks.      History provided by:  Patient  SILVESTREI  Presenting symptoms: cough, ear pain, fatigue, fever and rhinorrhea    Severity:  Moderate  Onset quality:  Gradual  Duration:  2 days  Timing:  Constant  Progression:  Unchanged  Chronicity:  New  Relieved by:  Nothing  Worsened by:  Nothing  Ineffective treatments:  None tried  Associated symptoms: arthralgias, myalgias and sinus pain    Risk factors: sick contacts        Review of Systems   Constitutional: Positive for fatigue and fever.   HENT: Positive for ear pain, rhinorrhea and sinus pain.    Respiratory: Positive for cough.    Cardiovascular: Negative.  Negative for chest pain.   Gastrointestinal: Negative.  Negative for abdominal pain.   Endocrine: Negative.    Genitourinary: Negative.  Negative for dysuria.   Musculoskeletal: Positive for arthralgias and myalgias.   Skin: Negative.    Neurological: Negative.    Psychiatric/Behavioral: Negative.    All other systems reviewed and are negative.      Past Medical History:   Diagnosis Date   • Anxiety    • Back pain    • Constipation    • Depression    • Endometriosis    • Heart murmur    • Heart murmur    • Infection, Shigella    • Migraines    • Ovarian cyst    • Pain     PELVIC   • PCOS (polycystic ovarian syndrome)    • Pelvic pain    • Wrist fracture        No Known Allergies    Past Surgical History:   Procedure Laterality Date   • ABDOMINAL SURGERY     •  SECTION     • CYSTOSCOPY BLADDER HYDRODISTENSION N/A 10/21/2020    Procedure: CYSTOSCOPY BLADDER HYDRODISTENSION;  Surgeon: Reji Baum MD;  Location: [facility];  Service: Urology;  Laterality: N/A;   • DIAGNOSTIC LAPAROSCOPY     •  DIAGNOSTIC LAPAROSCOPY N/A 7/6/2016    Procedure: DIAGNOSTIC LAPAROSCOPY IUD REMOVAL, FULGERATION OF ENDOMETRIOSIS;  Surgeon: Chapin Russell DO;  [facility];  Service:    • DIAGNOSTIC LAPAROSCOPY N/A 10/9/2019    Procedure: LAPAROSCOPY lysis of adhesions;  Surgeon: Chapin Russell DO;  [facility];  Service: Obstetrics/Gynecology       Family History   Problem Relation Age of Onset   • No Known Problems Father    • No Known Problems Mother    • Diabetes Maternal Grandmother    • Kidney disease Maternal Grandfather    • Heart disease Maternal Grandfather        Social History     Socioeconomic History   • Marital status:      Spouse name: Not on file   • Number of children: Not on file   • Years of education: Not on file   • Highest education level: Not on file   Tobacco Use   • Smoking status: Never Smoker   • Smokeless tobacco: Never Used   Substance and Sexual Activity   • Alcohol use: No   • Drug use: No   • Sexual activity: Yes     Partners: Male     Birth control/protection: I.U.D.           Objective   Physical Exam  Vitals signs and nursing note reviewed.   Constitutional:       Appearance: Normal appearance. She is ill-appearing.   HENT:      Head: Normocephalic and atraumatic.      Right Ear: Tympanic membrane normal.      Left Ear: Tympanic membrane normal.      Nose: Congestion and rhinorrhea present.      Mouth/Throat:      Mouth: Mucous membranes are moist.   Eyes:      Extraocular Movements: Extraocular movements intact.      Pupils: Pupils are equal, round, and reactive to light.   Neck:      Musculoskeletal: Normal range of motion.   Cardiovascular:      Rate and Rhythm: Normal rate and regular rhythm.      Pulses: Normal pulses.   Pulmonary:      Effort: Pulmonary effort is normal.   Abdominal:      General: Abdomen is flat.      Palpations: Abdomen is soft.   Musculoskeletal: Normal range of motion.   Skin:     General: Skin is warm.      Capillary Refill:  Capillary refill takes less than 2 seconds.   Neurological:      General: No focal deficit present.      Mental Status: She is alert.   Psychiatric:         Mood and Affect: Mood normal.         Behavior: Behavior normal.         Thought Content: Thought content normal.         Judgment: Judgment normal.         Procedures  Results for orders placed or performed during the hospital encounter of 12/10/20   COVID-19 and FLU A/B PCR - Swab, Nasopharynx    Specimen: Nasopharynx; Swab   Result Value Ref Range    COVID19 Not Detected Not Detected - Ref. Range    Influenza A PCR Not Detected Not Detected    Influenza B PCR Not Detected Not Detected   Comprehensive Metabolic Panel    Specimen: Blood   Result Value Ref Range    Glucose 101 (H) 65 - 99 mg/dL    BUN 9 6 - 20 mg/dL    Creatinine 0.90 0.57 - 1.00 mg/dL    Sodium 134 (L) 136 - 145 mmol/L    Potassium 4.0 3.5 - 5.2 mmol/L    Chloride 103 98 - 107 mmol/L    CO2 26.8 22.0 - 29.0 mmol/L    Calcium 9.3 8.6 - 10.5 mg/dL    Total Protein 7.2 6.0 - 8.5 g/dL    Albumin 4.44 3.50 - 5.20 g/dL    ALT (SGPT) 8 1 - 33 U/L    AST (SGOT) 13 1 - 32 U/L    Alkaline Phosphatase 82 39 - 117 U/L    Total Bilirubin 0.6 0.0 - 1.2 mg/dL    eGFR Non African Amer 74 >60 mL/min/1.73    Globulin 2.8 gm/dL    A/G Ratio 1.6 g/dL    BUN/Creatinine Ratio 10.0 7.0 - 25.0    Anion Gap 4.2 (L) 5.0 - 15.0 mmol/L   BNP    Specimen: Blood   Result Value Ref Range    proBNP 13.4 0.0 - 450.0 pg/mL   D-dimer, Quantitative    Specimen: Blood   Result Value Ref Range    D-Dimer, Quantitative <0.27 0.00 - 0.50 MCGFEU/mL   C-reactive Protein    Specimen: Blood   Result Value Ref Range    C-Reactive Protein 0.19 0.00 - 0.50 mg/dL   Ferritin    Specimen: Blood   Result Value Ref Range    Ferritin 65.38 13.00 - 150.00 ng/mL   Lactate Dehydrogenase    Specimen: Blood   Result Value Ref Range     135 - 214 U/L   CBC Auto Differential    Specimen: Blood   Result Value Ref Range    WBC 6.94 3.40 - 10.80  10*3/mm3    RBC 4.48 3.77 - 5.28 10*6/mm3    Hemoglobin 13.2 12.0 - 15.9 g/dL    Hematocrit 41.5 34.0 - 46.6 %    MCV 92.6 79.0 - 97.0 fL    MCH 29.5 26.6 - 33.0 pg    MCHC 31.8 31.5 - 35.7 g/dL    RDW 12.8 12.3 - 15.4 %    RDW-SD 43.7 37.0 - 54.0 fl    MPV 10.7 6.0 - 12.0 fL    Platelets 227 140 - 450 10*3/mm3    Neutrophil % 64.8 42.7 - 76.0 %    Lymphocyte % 27.1 19.6 - 45.3 %    Monocyte % 6.2 5.0 - 12.0 %    Eosinophil % 1.0 0.3 - 6.2 %    Basophil % 0.6 0.0 - 1.5 %    Immature Grans % 0.3 0.0 - 0.5 %    Neutrophils, Absolute 4.50 1.70 - 7.00 10*3/mm3    Lymphocytes, Absolute 1.88 0.70 - 3.10 10*3/mm3    Monocytes, Absolute 0.43 0.10 - 0.90 10*3/mm3    Eosinophils, Absolute 0.07 0.00 - 0.40 10*3/mm3    Basophils, Absolute 0.04 0.00 - 0.20 10*3/mm3    Immature Grans, Absolute 0.02 0.00 - 0.05 10*3/mm3    nRBC 0.0 0.0 - 0.2 /100 WBC     Xr Chest 1 View    Result Date: 12/10/2020  Narrative: XR CHEST 1 VW-  CLINICAL INDICATION: Covid symptoms   COMPARISON: None available  TECHNIQUE: Single frontal view of the chest.  FINDINGS:  There is no focal alveolar infiltrate or effusion. The cardiac silhouette is normal. The pulmonary vasculature is unremarkable. There is no evidence of an acute osseous abnormality. There are no suspicious-appearing parenchymal soft tissue nodules.       Impression: No evidence of active or acute cardiopulmonary disease on today's chest radiograph.  This report was finalized on 12/10/2020 8:19 AM by Dr. Franky Carrasco MD.               ED Course  ED Course as of Dec 10 1019   Thu Dec 10, 2020   0611 EKG shows normal sinus rhythm rate of 70 normal axis normal intervals  no acute ST or T wave changes noted    [JA]   1016 Care assumed from Dr. Bess at shift change.  No complaints at this time.  Work-up is unremarkable.  Covid is negative.  Discussed quarantine if she has had close contact with other workers who have been Covid positive.  She states that she will get retested by her  office in the next few days.    [BC]      ED Course User Index  [BC] Marquis Morales MD  [JA] Ford Monet MD                                           MDM  Number of Diagnoses or Management Options  Upper respiratory tract infection, unspecified type:      Amount and/or Complexity of Data Reviewed  Clinical lab tests: reviewed  Tests in the radiology section of CPT®: reviewed  Tests in the medicine section of CPT®: reviewed    Risk of Complications, Morbidity, and/or Mortality  Presenting problems: moderate  Diagnostic procedures: moderate  Management options: low        Final diagnoses:   Upper respiratory tract infection, unspecified type            Marquis Morales MD  12/10/20 1019

## 2020-12-15 ENCOUNTER — TRANSCRIBE ORDERS (OUTPATIENT)
Dept: ADMINISTRATIVE | Facility: HOSPITAL | Age: 30
End: 2020-12-15

## 2020-12-15 DIAGNOSIS — Z20.828 EXPOSURE TO SARS-ASSOCIATED CORONAVIRUS: Primary | ICD-10-CM

## 2020-12-16 ENCOUNTER — LAB (OUTPATIENT)
Dept: LAB | Facility: HOSPITAL | Age: 30
End: 2020-12-16

## 2020-12-16 DIAGNOSIS — Z20.828 EXPOSURE TO SARS-ASSOCIATED CORONAVIRUS: ICD-10-CM

## 2020-12-16 PROCEDURE — U0004 COV-19 TEST NON-CDC HGH THRU: HCPCS | Performed by: INTERNAL MEDICINE

## 2020-12-16 PROCEDURE — C9803 HOPD COVID-19 SPEC COLLECT: HCPCS

## 2020-12-17 LAB — SARS-COV-2 RNA RESP QL NAA+PROBE: NOT DETECTED

## 2020-12-28 ENCOUNTER — OFFICE VISIT (OUTPATIENT)
Dept: UROLOGY | Facility: CLINIC | Age: 30
End: 2020-12-28

## 2020-12-28 DIAGNOSIS — B37.31 YEAST VAGINITIS: ICD-10-CM

## 2020-12-28 DIAGNOSIS — N39.0 RECURRENT UTI: Primary | ICD-10-CM

## 2020-12-28 DIAGNOSIS — R35.0 FREQUENCY OF MICTURITION: ICD-10-CM

## 2020-12-28 PROCEDURE — 99214 OFFICE O/P EST MOD 30 MIN: CPT | Performed by: NURSE PRACTITIONER

## 2020-12-28 PROCEDURE — 87086 URINE CULTURE/COLONY COUNT: CPT | Performed by: NURSE PRACTITIONER

## 2020-12-28 RX ORDER — FLUCONAZOLE 150 MG/1
150 TABLET ORAL ONCE
Qty: 1 TABLET | Refills: 0 | Status: SHIPPED | OUTPATIENT
Start: 2020-12-28 | End: 2020-12-28

## 2020-12-28 RX ORDER — LACTOBACILLUS RHAMNOSUS GG 10B CELL
1 CAPSULE ORAL DAILY
Qty: 90 CAPSULE | Refills: 3 | Status: SHIPPED | OUTPATIENT
Start: 2020-12-28

## 2020-12-28 NOTE — PROGRESS NOTES
Chief Complaint:          Chief Complaint   Patient presents with   • Recurrent UTI /Yeast Vaginitis     Follow Up Recurrent UTI / IC       HPI:   30 y.o. femaleVery Pleasant Patient returns today for follow up. She is on ABX suppresive therapy with Macrobid daily for recurrent UTI. She had a urine check at her PCP today which was completely negative despite feeling UTI symptoms. However due to her increasing discomfort, she took some pyridium prior to arrival at clinic.     On Exam in clinic today, she reports urinary symptoms of frequency, urgency, and dysuria. She reports flank pain consistent with her prior UTIs , but denies back pain or any CVA tenderness.  Denies burning on urination, N/V/D, denies chills or fevers.    She had Cystoscopy  bladder hydrodistention secondary 10/21 for severe ongoing urinary symptoms of frequency, urgency, dysuria, lower back pain, urinary discomfort, with significant bladder pain and discomfort with urinary symptoms consistent with interstitial cystitis with significant improvement in her symptoms.    Will send her urine out for culture.      Past Medical History:        Past Medical History:   Diagnosis Date   • Anxiety    • Back pain    • Constipation    • Depression    • Endometriosis    • Heart murmur    • Heart murmur    • Infection, Shigella 2005   • Migraines    • Ovarian cyst    • Pain     PELVIC   • PCOS (polycystic ovarian syndrome)    • Pelvic pain    • Wrist fracture      The following portions of the patient's history were reviewed and updated as appropriate: allergies, current medications, past family history, past medical history, past social history, past surgical history and problem list.    Current Meds:     Current Outpatient Medications   Medication Sig Dispense Refill   • escitalopram (LEXAPRO) 20 MG tablet Take 20 mg by mouth Daily.     • linaclotide (LINZESS) 290 MCG capsule capsule Take 290 mcg by mouth Every Morning Before Breakfast.     • traZODone  (DESYREL) 150 MG tablet Take 150 mg by mouth Every Night.     • fluconazole (DIFLUCAN) 150 MG tablet Take 1 tablet by mouth 1 (One) Time for 1 dose. 1 tablet 0   • probiotic (CULTURELLE) capsule capsule Take 1 capsule by mouth Daily. 90 capsule 3     No current facility-administered medications for this visit.         Allergies:      No Known Allergies     Past Surgical History:     Past Surgical History:   Procedure Laterality Date   • ABDOMINAL SURGERY     •  SECTION     • CYSTOSCOPY BLADDER HYDRODISTENSION N/A 10/21/2020    Procedure: CYSTOSCOPY BLADDER HYDRODISTENSION;  Surgeon: Reji Baum MD;  Location: [facility];  Service: Urology;  Laterality: N/A;   • DIAGNOSTIC LAPAROSCOPY     • DIAGNOSTIC LAPAROSCOPY N/A 2016    Procedure: DIAGNOSTIC LAPAROSCOPY IUD REMOVAL, FULGERATION OF ENDOMETRIOSIS;  Surgeon: Chapin Russell DO;  Location: [facility];  Service:    • DIAGNOSTIC LAPAROSCOPY N/A 10/9/2019    Procedure: LAPAROSCOPY lysis of adhesions;  Surgeon: Chapin Russell DO;  Location: [facility];  Service: Obstetrics/Gynecology         Social History:     Social History     Socioeconomic History   • Marital status:      Spouse name: Not on file   • Number of children: Not on file   • Years of education: Not on file   • Highest education level: Not on file   Tobacco Use   • Smoking status: Never Smoker   • Smokeless tobacco: Never Used   Substance and Sexual Activity   • Alcohol use: No   • Drug use: No   • Sexual activity: Yes     Partners: Male     Birth control/protection: I.U.D.       Family History:     Family History   Problem Relation Age of Onset   • No Known Problems Father    • No Known Problems Mother    • Diabetes Maternal Grandmother    • Kidney disease Maternal Grandfather    • Heart disease Maternal Grandfather        Review of Systems:     Review of Systems   Constitutional: Positive for activity change. Negative for chills, fatigue and fever.   HENT:  Negative for congestion and sinus pressure.    Eyes: Negative for blurred vision.   Respiratory: Negative for cough.    Cardiovascular: Negative for leg swelling.   Gastrointestinal: Positive for abdominal pain. Negative for nausea and vomiting.   Genitourinary: Positive for dyspareunia, dysuria, flank pain, frequency, pelvic pain, pelvic pressure, urgency, vaginal discharge and vaginal pain. Negative for difficulty urinating, genital sores, hematuria and urinary incontinence.   Musculoskeletal: Negative for back pain.   Neurological: Negative for dizziness, headache and confusion.   Psychiatric/Behavioral: Positive for sleep disturbance and stress. Negative for behavioral problems and decreased concentration. The patient is not nervous/anxious.         Physical Exam:     Physical Exam  Constitutional:       General: She is not in acute distress.     Appearance: She is well-developed.   HENT:      Head: Normocephalic and atraumatic.   Eyes:      Pupils: Pupils are equal, round, and reactive to light.   Neck:      Musculoskeletal: Normal range of motion.      Thyroid: No thyromegaly.      Trachea: No tracheal deviation.   Cardiovascular:      Rate and Rhythm: Normal rate and regular rhythm.      Heart sounds: No murmur.   Pulmonary:      Effort: Pulmonary effort is normal. No respiratory distress.      Breath sounds: Normal breath sounds. No stridor. No wheezing.   Abdominal:      General: Bowel sounds are normal.      Palpations: Abdomen is soft.      Tenderness: There is abdominal tenderness.   Genitourinary:     Labia:         Right: No tenderness.         Left: No tenderness.       Vagina: Normal. No vaginal discharge.   Musculoskeletal: Normal range of motion.         General: Tenderness present. No deformity.   Skin:     General: Skin is warm and dry.      Capillary Refill: Capillary refill takes less than 2 seconds.      Coloration: Skin is not pale.      Findings: No erythema or rash.   Neurological:       Mental Status: She is alert and oriented to person, place, and time.      Cranial Nerves: No cranial nerve deficit.      Sensory: No sensory deficit.      Coordination: Coordination normal.   Psychiatric:         Behavior: Behavior normal.         Thought Content: Thought content normal.         Judgment: Judgment normal.         Procedure:       Assessment/Plan:     Interstitial Cystitis: Post  Opt Bladder Hydrodistention/Recurrent UTIs : Patient is seen for follow-up on UTI/IC symptoms today. She is in no apparent distress but reports vaginal itching, irritation, discharge consistent with yeast vaginitis. She had an  anesthetic cystoscopy with bladder hydrodistention secondary to significant interstitial cystitis on 10/21/2020 by . Patient reports doing relatively well with intermittent flares for which she takes pyridium.     Nevertheless, she reports urinary symptoms of frequency, urgency, and dysuria this weekend which is becoming bothersome to her.  Again, we discussed the diagnosis and management of this condition(IC).  We indicated that it was a multifactorial condition with multifactorial symptomatology.  Including frequency, urgency, the sensation of urinary tract infection in the absence of a positive culture, sexual symptomatology including significant dyspareunia.        We also discussed medical management including pharmacologic treatment such as amitriptyline, naturopathic treatments such as pumpkin oil and which more aggressive options of Botox injections as well as the relative risks and merits of this. We also discussed the use of dietary manipulation including the over-the-counter product relief which basically decreases the acid in the urine and avoidance of ascitic-containing foods such as citrus is etc.IC smart diet plan given to patient.       We also discussed the risk factors for recurrent infections being intercourse in younger patients and atrophic changes in older patients.  We  discussed the symptoms that are found including pain, pressure, burning, frequency, urgency suprapubic pain and painful intercourse. I discussed upper tract symptoms including fevers, chills, and indicated the workup would be much more aggressive if the patient were to present with recurrent infections in the face of upper tract symptomatology such as fever.                                                                                                                 PLAN  We sent her urine for culture today, patient is on Pyridium I will call her with results if any bacterial growth not sensitive to current therapy.     We discussed she continue her antibiotic prophylaxis with 100 mg of Macrobid daily as directed. She should increase her p.o. fluid intake to at least 1 to 2 L daily and avoid bladder irritants such as caffeine products, spicy foods, and citrusy foods.      I recommend concomitant probiotics -Culturelle daily with treatment with antibiotics to protect the rectal reservoir including over-the-counter yogurt preparations to brenna oral pills containing the appropriate probiotics. Patient reports the diligent use of Probiotics.    Diflucan 150 milligrams x 1 dose. May repeat in 7 days if symptomatic -Yeast Vaginitis.     Zofran 0.4 mg as needed nausea     Follow-up in 3 months or sooner if symptoms worsen.     She is agreeable plan of care.     Patient reports that she is not currently experiencing any symptoms of urinary incontinence    Patient's Body mass index is 25.5 kg/m². BMI is within normal parameters. No follow-up required..    Smoking Cessation Counseling:  Never a smoker.  Patient does not currently use any tobacco products.      Counseling was given to patient for the following topics diagnostic results including: Yeast Vaginitis,  Interstitial cystitis, recurrent UTIs., instructions for management as follows: Increase p.o. fluid intake, IC smart diet, continue Macrobid 100 mg daily, and risk  factor reductions including: Bladder irritants such as caffeine products, citrusy/spicy foods. The interim medical history and current results were reviewed.  A treatment plan with follow-up was made for IC, Yeast Vaginitis,  Recurrent UTI [N39.0].          This document has been electronically signed by Griselda Cheng-Akwa, APRN December 28, 2020 11:57 EST

## 2020-12-29 ENCOUNTER — TELEPHONE (OUTPATIENT)
Dept: UROLOGY | Facility: CLINIC | Age: 30
End: 2020-12-29

## 2020-12-29 LAB — BACTERIA SPEC AEROBE CULT: NO GROWTH

## 2020-12-29 NOTE — TELEPHONE ENCOUNTER
CALLED PATIENT TO CHECK ON HOW SHE WAS DOING AND TO DISCUSS URINE CULTURE CAME BACK NEGATIVE. SHE SHOULD CONTINUE ABX SUPPRESSIVE THERAPY AND INCREASE HYDRATION AS DISCUSSED.  VOICE MAIL NOT SET UP.        Urine Culture No growth          Resulting Agency:  [facility]

## 2020-12-30 VITALS — TEMPERATURE: 97.8 F | BODY MASS INDEX: 25.39 KG/M2 | HEIGHT: 66 IN | WEIGHT: 158 LBS

## 2021-01-22 NOTE — H&P
"    This 29 year old female presents for Pelvic Pain and annual exam.      History of Present Illness:  1.  Pelvic Pain   2.  annual exam   Currently pregnant: no. : 2. Parity: Term: 2. Livin. The patient states she uses condoms, male for birth control. Last LMP was 2019. Her menses is  with a frequency of every 28 days. Diet healthy. She has not been exposed to passive smoke. She has not been exposed to passive vaping. She does not drink alcohol. Additional information: Has been having more pain for the last several months.  It is severe.  Some foods make her have pain.  Periods are \"horrifying\".  Has two children. Pain is also through the month and is worse on the RIGHT side.  She would like to have one more child..                Screening Tools  Other Screenings:  Encounter Date Documented Date Instrument Score Severity/Interpretation MDD Classification   2019 Patient Health Questionnaire (PHQ-2) 0 Further testing is not required        PATIENT HEALTH QUESTIONNAIRE  Over the last 2 weeks, how often have you been bothered by any of the following problems?     NOT AT ALL SEVERAL DAYS MORE THEN   HALF THE   DAYS NEARLY  EVERY DAY   1. Little interest or pleasure in doing things X      2. Feeling down, depressed or hopeless X          Gynecologic History:  Patient is premenopausal. Last menses was 2019.   Menarche:  13 y.o. (onset)  Patient has not had a hysterectomy.   Date of last Pap: 2018.    OBSTETRIC HISTORY    Not currently pregnant.   :2.      Parity: Term:2.  Pre-Term: 0.  : 0.  Livin.    Full term: 2.  C-sections: 1.  SVDs: 1.  Livin.    Feeding method: 0.  Past Systemic History    Medical History (Reviewed,updated)  Disease Onset Date Comments   endometriosis         Surgical History/Management (Reviewed,updated)  Management Date Comments   heart murmur     laproscopic surgery     Diagnostics History:  Status Study Ordered Completed " Addended by: TOSHA SUAREZ on: 1/22/2021 03:55 PM     Modules accepted: Orders     Interpretation  Result / Report   completed GI Nuclear Exam NEC 05/15/2012 05/23/2012      completed US EXAM, ABDOM, COMPLETE 05/01/2012 05/11/2012      completed XR KNEE 3 VIEW 03/11/2019 03/11/2019          Pap Result, HPV Detail and Diagnostic/Treatment Performed  Date Test Procedure Pap Result HPV Detail Comments   10/02/2019  See module     07/19/2018  See module       PROBLEM LIST:   Problem List reviewed.   Problem Description Onset Date Chronic Clinical Status Notes   Anxiety  Y     Migraines  Y     Insomnia  Y     Vitamin B12 deficiency  Y           Medications (active prior to today)  Medication Name Sig Description Start Date Stop Date Refilled Rx Elsewhere   Vitamin B-12 1,000 mcg/mL injection solution inject 1ml weekly for 4 weeks then monthly- in office 04/17/2018 04/17/2018 N   Retin-A 0.1 % topical cream apply by topical route  every day to the affected area(s) at bedtime 04/17/2018 04/17/2018 N   trazodone 100 mg tablet take 1 tablet by oral route  every day at bedtime 04/03/2019 04/03/2019 N   Topamax 50 mg tablet take 1 tablet by oral route 2 times every day 04/03/2019 04/03/2019 N   metformin  mg 24 hr tablet,extended release take one tablet by mouth twice a day 04/03/2019   N   Lexapro 20 mg tablet take 1 tablet by oral route  every day 04/03/2019   N   Progesterone  TOPICAL OIL compound progesterone 6gm.  Apply 5-6 gtts q hs in 15 ml pg. 04/03/2019   N   glycerin (adult) rectal suppository Insert one suppository into rectum for constipation 07/12/2019   N   Colace 100 mg capsule take 1 capsule by oral route  every day at bedtime as needed 07/12/2019   N   ibuprofen 800 mg tablet take 1 tablet by oral route 3 times every day with food as needed 09/17/2019   N   Norco 5 mg-325 mg tablet take 1 tablet by oral route  every 6 hours as needed for pain 09/17/2019   N     Patient Status   Completed with information received for patient transitioning into care.       Allergies:  Ingredient  Reaction (Severity) Medication Name Comment   NO KNOWN ALLERGIES          Family History  (Reviewed, updated)  Relationship Family Member Name  Age at Death Condition Onset Age Cause of Death   Father    Brain tumor  N   Maternal grandfather    Hypertension  N   Maternal grandmother    Hypertension  N   Maternal grandmother    Diabetes mellitus  N   Paternal grandfather    Hypertension  N   Paternal grandmother    Cancer, breast 49 N   M    Social History:  (Reviewed, updated)  Preferred language is English.    EDUCATION/EMPLOYMENT/OCCUPATION  The patient has a(n) college student education.    Employment History Status Retired Restrictions     unemployed       MARITAL STATUS/FAMILY/SOCIAL SUPPORT  Currently .    Tobacco use status: Current non-smoker.  Smoking status: Never smoker.    VAPING USE  Screened for vaping? Yes  Status: Not a current user    TOBACCO/VAPING EXPOSURE  No passive vaping exposure.  No passive smoke exposure.    ALCOHOL  There is no history of alcohol use.   CAFFEINE  The patient uses caffeine - occasionally a day.  LIFESTYLE  DIET  healthy.  Yazdanism/SPIRITUAL  The patient has None Hoahaoism affiliation.    Doesn't practice Islam.  Has spiritual beliefs.            Review of Systems  System Neg/Pos Details   Reproductive Positive Menarche age (Menarche age was 13), Menses (Frequency: every 28 days.  Last menses was 2019), The patient is pre-menopausal.       Vital Signs     Last menses was 2019.   Time BP mm/Hg Pulse /min Resp /min Temp F Ht ft Ht in Ht cm Wt lb Wt kg BMI kg/m2 BSA m2 O2 Sat%   4:21 /82 104 18 98 5 5.5 166.37 149.6 67.857 24.52  98     Measured By  Time Measured by   4:21 PM [facility]     Physical Exam  Exam Findings Details   Constitutional Normal Well developed.   Neck Exam Normal Palpation - Normal. Thyroid gland - Normal.   Breast * Self breast exam has been taught. Patient performs self breast exam.   Breast Normal Inspection -  Bilateral: Normal. Palpation - Bilateral: Normal. Nipples - Bilateral: Normal. Lymph nodes - Normal.   Respiratory Normal Inspection - Normal. Auscultation - Normal. Effort - Normal.   Abdomen Normal Anterior palpation -  No rebound. No abdominal tenderness. No hepatic enlargement. No hernia.   Genitourinary * Adnexa - tenderness, bilateral.   Genitourinary Normal Urethral meatus - Normal. External genitalia - Normal. Glands - Normal. Perineum - Normal. Anus - Normal. Vagina - Normal. Cervix - Normal. Uterus - Normal. Rectovaginal - Normal. Bladder - Normal. No suprapubic tenderness. No CVA tenderness. No vaginal discharge.   Skin Normal Inspection - Normal.   Extremity Normal No edema.   Psychiatric Normal Orientation - Oriented to time, place, person & situation. Appropriate mood and affect.       Completed Orders (this encounter)  Order Details Reason Side Interpretation Result Initial Treatment Date Region   Patient Health Questionnaire (PHQ-2)    Further testing is not required 0     Pre-Visit Planning            Assessment/Plan  # Detail Type Description    1. Assessment Pelvic pain in female (R10.2).    Plan Orders She will be scheduled for LAPAROSCOPY, EXCISE LESIONS, Next Lab Date is on 10/09/2019.         2. Assessment Endometriosis determined by laparoscopy (N80.9).         3. Assessment Encntr for gyn exam (general) (routine) w/o abn findings (Z01.419).                Diagnostic History Entered Today  Performed Study Interpretation Result   09/26/2019 Pre-Visit Planning     Clinical Guidelines (Reviewed, updated)  Guidelines Status Due Action Last Addressed   Depression screening Completed 09/26/2020 Completed on 09/26/2019 09/26/2019   PAP Completed 09/27/2022 Completed on 07/12/2018 07/12/2018   Pap/HPV testing Completed 09/27/2024 Completed on 09/27/2019 09/27/2019   Pre-Visit Planning Completed 10/03/2019 Completed on 09/26/2019 09/26/2019     Clinical Guidelines (Declined or Excluded)  Guidelines  Status Due Action Last Addressed   Influenza (3yrs and older) Declined 05/31/2017 preference      Patient Education  # Patient Education   1. Well Visit, Ages 18 to 50: Care Instr~     Medications (Added, Continued or Stopped today):  Start Date Medication Directions PRN Status PRN Reason Instruction Stop Date   07/12/2019 Colace 100 mg capsule take 1 capsule by oral route  every day at bedtime as needed N      07/12/2019 glycerin (adult) rectal suppository Insert one suppository into rectum for constipation N constipation     09/17/2019 ibuprofen 800 mg tablet take 1 tablet by oral route 3 times every day with food as needed Y      04/03/2019 Lexapro 20 mg tablet take 1 tablet by oral route  every day N      09/26/2019 Linzess 290 mcg capsule take 1 capsule by oral route  every day on an empty stomach at least 30 minutes before 1st meal of the day N      04/03/2019 metformin  mg 24 hr tablet,extended release take one tablet by mouth twice a day N      09/17/2019 Norco 5 mg-325 mg tablet take 1 tablet by oral route  every 6 hours as needed for pain N      04/03/2019 Progesterone  TOPICAL OIL compound progesterone 6gm.  Apply 5-6 gtts q hs in 15 ml pg. N      04/17/2018 Retin-A 0.1 % topical cream apply by topical route  every day to the affected area(s) at bedtime N      04/03/2019 Topamax 50 mg tablet take 1 tablet by oral route 2 times every day N      04/03/2019 trazodone 100 mg tablet take 1 tablet by oral route  every day at bedtime N      04/17/2018 Vitamin B-12 1,000 mcg/mL injection solution inject 1ml weekly for 4 weeks then monthly- in office N          ORDERS/PROCEDURES/INSTRUCTIONS/EDUCATION  LABS:  Pap IG, Ct-Ng TV rfx HPV ASCU   OFFICE PROCEDURES/SERVICES:  LAPAROSCOPY, EXCISE LESIONS         Supplementation:   Diet healthy.

## 2021-03-18 ENCOUNTER — BULK ORDERING (OUTPATIENT)
Dept: CASE MANAGEMENT | Facility: OTHER | Age: 31
End: 2021-03-18

## 2021-03-18 DIAGNOSIS — Z23 IMMUNIZATION DUE: ICD-10-CM

## 2021-04-22 ENCOUNTER — TRANSCRIBE ORDERS (OUTPATIENT)
Dept: ADMINISTRATIVE | Facility: HOSPITAL | Age: 31
End: 2021-04-22

## 2021-04-22 DIAGNOSIS — R01.1 HEART MURMUR: Primary | ICD-10-CM

## 2021-05-06 ENCOUNTER — HOSPITAL ENCOUNTER (OUTPATIENT)
Dept: CARDIOLOGY | Facility: HOSPITAL | Age: 31
Discharge: HOME | End: 2021-05-06
Admitting: FAMILY MEDICINE

## 2021-05-06 DIAGNOSIS — R01.1 HEART MURMUR: ICD-10-CM

## 2021-05-06 LAB
BH CV ECHO MEAS - % IVS THICK: 21.6 %
BH CV ECHO MEAS - % LVPW THICK: 79.1 %
BH CV ECHO MEAS - ACS: 1.8 CM
BH CV ECHO MEAS - AO MAX PG: 7.3 MMHG
BH CV ECHO MEAS - AO MEAN PG: 4 MMHG
BH CV ECHO MEAS - AO ROOT AREA (BSA CORRECTED): 1.2
BH CV ECHO MEAS - AO ROOT AREA: 3.5 CM^2
BH CV ECHO MEAS - AO ROOT DIAM: 2.1 CM
BH CV ECHO MEAS - AO V2 MAX: 135 CM/SEC
BH CV ECHO MEAS - AO V2 MEAN: 90.5 CM/SEC
BH CV ECHO MEAS - AO V2 VTI: 29.2 CM
BH CV ECHO MEAS - BSA(HAYCOCK): 1.8 M^2
BH CV ECHO MEAS - BSA: 1.8 M^2
BH CV ECHO MEAS - BZI_BMI: 25.5 KILOGRAMS/M^2
BH CV ECHO MEAS - BZI_METRIC_HEIGHT: 167.6 CM
BH CV ECHO MEAS - BZI_METRIC_WEIGHT: 71.7 KG
BH CV ECHO MEAS - EDV(CUBED): 41.6 ML
BH CV ECHO MEAS - EDV(MOD-SP4): 35.4 ML
BH CV ECHO MEAS - EDV(TEICH): 49.7 ML
BH CV ECHO MEAS - EF(CUBED): 52.2 %
BH CV ECHO MEAS - EF(MOD-SP4): 68.4 %
BH CV ECHO MEAS - EF(TEICH): 45.1 %
BH CV ECHO MEAS - ESV(CUBED): 19.9 ML
BH CV ECHO MEAS - ESV(MOD-SP4): 11.2 ML
BH CV ECHO MEAS - ESV(TEICH): 27.3 ML
BH CV ECHO MEAS - FS: 21.8 %
BH CV ECHO MEAS - IVS/LVPW: 1.1
BH CV ECHO MEAS - IVSD: 0.78 CM
BH CV ECHO MEAS - IVSS: 0.95 CM
BH CV ECHO MEAS - LA DIMENSION: 2.6 CM
BH CV ECHO MEAS - LA/AO: 1.2
BH CV ECHO MEAS - LV DIASTOLIC VOL/BSA (35-75): 19.6 ML/M^2
BH CV ECHO MEAS - LV MASS(C)D: 67.1 GRAMS
BH CV ECHO MEAS - LV MASS(C)DI: 37.1 GRAMS/M^2
BH CV ECHO MEAS - LV MASS(C)S: 83.3 GRAMS
BH CV ECHO MEAS - LV MASS(C)SI: 46 GRAMS/M^2
BH CV ECHO MEAS - LV SYSTOLIC VOL/BSA (12-30): 6.2 ML/M^2
BH CV ECHO MEAS - LVIDD: 3.5 CM
BH CV ECHO MEAS - LVIDS: 2.7 CM
BH CV ECHO MEAS - LVLD AP4: 6.6 CM
BH CV ECHO MEAS - LVLS AP4: 4.8 CM
BH CV ECHO MEAS - LVOT AREA (M): 2.3 CM^2
BH CV ECHO MEAS - LVOT AREA: 2.3 CM^2
BH CV ECHO MEAS - LVOT DIAM: 1.7 CM
BH CV ECHO MEAS - LVPWD: 0.7 CM
BH CV ECHO MEAS - LVPWS: 1.3 CM
BH CV ECHO MEAS - MV A MAX VEL: 89.3 CM/SEC
BH CV ECHO MEAS - MV E MAX VEL: 114 CM/SEC
BH CV ECHO MEAS - MV E/A: 1.3
BH CV ECHO MEAS - PA ACC TIME: 0.09 SEC
BH CV ECHO MEAS - PA PR(ACCEL): 37.6 MMHG
BH CV ECHO MEAS - RAP SYSTOLE: 10 MMHG
BH CV ECHO MEAS - RVSP: 25.2 MMHG
BH CV ECHO MEAS - SI(AO): 55.9 ML/M^2
BH CV ECHO MEAS - SI(CUBED): 12 ML/M^2
BH CV ECHO MEAS - SI(MOD-SP4): 13.4 ML/M^2
BH CV ECHO MEAS - SI(TEICH): 12.4 ML/M^2
BH CV ECHO MEAS - SV(AO): 101.1 ML
BH CV ECHO MEAS - SV(CUBED): 21.7 ML
BH CV ECHO MEAS - SV(MOD-SP4): 24.2 ML
BH CV ECHO MEAS - SV(TEICH): 22.4 ML
BH CV ECHO MEAS - TR MAX VEL: 194.7 CM/SEC
MAXIMAL PREDICTED HEART RATE: 190 BPM
STRESS TARGET HR: 162 BPM

## 2021-05-06 PROCEDURE — 93306 TTE W/DOPPLER COMPLETE: CPT

## 2021-05-25 ENCOUNTER — LAB (OUTPATIENT)
Dept: LAB | Facility: HOSPITAL | Age: 31
End: 2021-05-25

## 2021-05-25 ENCOUNTER — TRANSCRIBE ORDERS (OUTPATIENT)
Dept: ADMINISTRATIVE | Facility: HOSPITAL | Age: 31
End: 2021-05-25

## 2021-05-25 DIAGNOSIS — N17.9 ACUTE RENAL FAILURE, UNSPECIFIED ACUTE RENAL FAILURE TYPE (HCC): ICD-10-CM

## 2021-05-25 DIAGNOSIS — N17.9 ACUTE RENAL FAILURE, UNSPECIFIED ACUTE RENAL FAILURE TYPE (HCC): Primary | ICD-10-CM

## 2021-05-25 LAB
ALBUMIN SERPL-MCNC: 4.2 G/DL (ref 3.5–5.2)
ALBUMIN UR-MCNC: <1.2 MG/DL
ALBUMIN/GLOB SERPL: 2.2 G/DL
ALP SERPL-CCNC: 55 U/L (ref 39–117)
ALT SERPL W P-5'-P-CCNC: 17 U/L (ref 1–33)
ANION GAP SERPL CALCULATED.3IONS-SCNC: 9.4 MMOL/L (ref 5–15)
AST SERPL-CCNC: 15 U/L (ref 1–32)
BACTERIA UR QL AUTO: ABNORMAL /HPF
BILIRUB SERPL-MCNC: 0.6 MG/DL (ref 0–1.2)
BILIRUB UR QL STRIP: NEGATIVE
BUN SERPL-MCNC: 11 MG/DL (ref 6–20)
BUN/CREAT SERPL: 10.7 (ref 7–25)
CALCIUM SPEC-SCNC: 8.7 MG/DL (ref 8.6–10.5)
CHLORIDE SERPL-SCNC: 110 MMOL/L (ref 98–107)
CLARITY UR: ABNORMAL
CO2 SERPL-SCNC: 21.6 MMOL/L (ref 22–29)
COD CRY URNS QL: ABNORMAL /HPF
COLOR UR: ABNORMAL
CREAT SERPL-MCNC: 1.03 MG/DL (ref 0.57–1)
CREAT UR-MCNC: 226.6 MG/DL
CREAT UR-MCNC: 226.6 MG/DL
GFR SERPL CREATININE-BSD FRML MDRD: 63 ML/MIN/1.73
GLOBULIN UR ELPH-MCNC: 1.9 GM/DL
GLUCOSE SERPL-MCNC: 70 MG/DL (ref 65–99)
GLUCOSE UR STRIP-MCNC: NEGATIVE MG/DL
HGB UR QL STRIP.AUTO: NEGATIVE
HYALINE CASTS UR QL AUTO: ABNORMAL /LPF
KETONES UR QL STRIP: NEGATIVE
LEUKOCYTE ESTERASE UR QL STRIP.AUTO: NEGATIVE
MICROALBUMIN/CREAT UR: NORMAL MG/G{CREAT}
NITRITE UR QL STRIP: NEGATIVE
PH UR STRIP.AUTO: 5.5 [PH] (ref 5–8)
PHOSPHATE SERPL-MCNC: 3.4 MG/DL (ref 2.5–4.5)
POTASSIUM SERPL-SCNC: 3.5 MMOL/L (ref 3.5–5.2)
PROT SERPL-MCNC: 6.1 G/DL (ref 6–8.5)
PROT UR QL STRIP: NEGATIVE
PROT UR-MCNC: 14 MG/DL
PROT/CREAT UR: 61.8 MG/G CREA (ref 0–200)
RBC # UR: ABNORMAL /HPF
REF LAB TEST METHOD: ABNORMAL
SODIUM SERPL-SCNC: 141 MMOL/L (ref 136–145)
SP GR UR STRIP: 1.02 (ref 1–1.03)
SQUAMOUS #/AREA URNS HPF: ABNORMAL /HPF
URATE SERPL-MCNC: 3.6 MG/DL (ref 2.4–5.7)
UROBILINOGEN UR QL STRIP: ABNORMAL
WBC UR QL AUTO: ABNORMAL /HPF

## 2021-05-25 PROCEDURE — 81001 URINALYSIS AUTO W/SCOPE: CPT

## 2021-05-25 PROCEDURE — 36415 COLL VENOUS BLD VENIPUNCTURE: CPT

## 2021-05-25 PROCEDURE — 84550 ASSAY OF BLOOD/URIC ACID: CPT

## 2021-05-25 PROCEDURE — 82043 UR ALBUMIN QUANTITATIVE: CPT

## 2021-05-25 PROCEDURE — 82570 ASSAY OF URINE CREATININE: CPT

## 2021-05-25 PROCEDURE — 84156 ASSAY OF PROTEIN URINE: CPT

## 2021-05-25 PROCEDURE — 84100 ASSAY OF PHOSPHORUS: CPT

## 2021-05-25 PROCEDURE — 80053 COMPREHEN METABOLIC PANEL: CPT

## 2021-05-25 PROCEDURE — 82131 AMINO ACIDS SINGLE QUANT: CPT

## 2021-05-28 LAB
Lab: NORMAL
PHE SERPL-MCNC: 1 MG/DL
PHE SERPL-SCNC: 63.2 UMOL/L (ref 35.8–76.9)
REF LAB TEST METHOD: NORMAL

## 2021-12-27 ENCOUNTER — TRANSCRIBE ORDERS (OUTPATIENT)
Dept: ADMINISTRATIVE | Facility: HOSPITAL | Age: 31
End: 2021-12-27

## 2021-12-27 ENCOUNTER — LAB (OUTPATIENT)
Dept: LAB | Facility: HOSPITAL | Age: 31
End: 2021-12-27

## 2021-12-27 DIAGNOSIS — N17.9 ACUTE RENAL FAILURE, UNSPECIFIED ACUTE RENAL FAILURE TYPE (HCC): ICD-10-CM

## 2021-12-27 DIAGNOSIS — N17.9 ACUTE RENAL FAILURE, UNSPECIFIED ACUTE RENAL FAILURE TYPE (HCC): Primary | ICD-10-CM

## 2021-12-27 LAB
ALBUMIN SERPL-MCNC: 4.3 G/DL (ref 3.5–5.2)
ALBUMIN UR-MCNC: 1.4 MG/DL
ALBUMIN/GLOB SERPL: 1.7 G/DL
ALP SERPL-CCNC: 71 U/L (ref 39–117)
ALT SERPL W P-5'-P-CCNC: 10 U/L (ref 1–33)
ANION GAP SERPL CALCULATED.3IONS-SCNC: 7.2 MMOL/L (ref 5–15)
AST SERPL-CCNC: 11 U/L (ref 1–32)
BACTERIA UR QL AUTO: NORMAL /HPF
BILIRUB SERPL-MCNC: 0.6 MG/DL (ref 0–1.2)
BILIRUB UR QL STRIP: NEGATIVE
BUN SERPL-MCNC: 14 MG/DL (ref 6–20)
BUN/CREAT SERPL: 14.7 (ref 7–25)
CALCIUM SPEC-SCNC: 8.8 MG/DL (ref 8.6–10.5)
CHLORIDE SERPL-SCNC: 107 MMOL/L (ref 98–107)
CLARITY UR: ABNORMAL
CO2 SERPL-SCNC: 25.8 MMOL/L (ref 22–29)
COLOR UR: YELLOW
CREAT SERPL-MCNC: 0.95 MG/DL (ref 0.57–1)
CREAT UR-MCNC: 122.9 MG/DL
CREAT UR-MCNC: 122.9 MG/DL
GFR SERPL CREATININE-BSD FRML MDRD: 69 ML/MIN/1.73
GLOBULIN UR ELPH-MCNC: 2.5 GM/DL
GLUCOSE SERPL-MCNC: 89 MG/DL (ref 65–99)
GLUCOSE UR STRIP-MCNC: NEGATIVE MG/DL
HGB UR QL STRIP.AUTO: ABNORMAL
HYALINE CASTS UR QL AUTO: NORMAL /LPF
KETONES UR QL STRIP: NEGATIVE
LEUKOCYTE ESTERASE UR QL STRIP.AUTO: ABNORMAL
MICROALBUMIN/CREAT UR: 11.4 MG/G
NITRITE UR QL STRIP: NEGATIVE
PH UR STRIP.AUTO: 8.5 [PH] (ref 5–8)
POTASSIUM SERPL-SCNC: 4.4 MMOL/L (ref 3.5–5.2)
PROT ?TM UR-MCNC: 11 MG/DL
PROT SERPL-MCNC: 6.8 G/DL (ref 6–8.5)
PROT UR QL STRIP: ABNORMAL
PROT/CREAT UR: 89.5 MG/G CREA (ref 0–200)
RBC # UR STRIP: NORMAL /HPF
REF LAB TEST METHOD: NORMAL
SODIUM SERPL-SCNC: 140 MMOL/L (ref 136–145)
SP GR UR STRIP: 1.02 (ref 1–1.03)
SQUAMOUS #/AREA URNS HPF: NORMAL /HPF
UROBILINOGEN UR QL STRIP: ABNORMAL
WBC # UR STRIP: NORMAL /HPF

## 2021-12-27 PROCEDURE — 84156 ASSAY OF PROTEIN URINE: CPT

## 2021-12-27 PROCEDURE — 82570 ASSAY OF URINE CREATININE: CPT

## 2021-12-27 PROCEDURE — 81001 URINALYSIS AUTO W/SCOPE: CPT

## 2021-12-27 PROCEDURE — 82043 UR ALBUMIN QUANTITATIVE: CPT

## 2021-12-27 PROCEDURE — 80053 COMPREHEN METABOLIC PANEL: CPT

## 2021-12-27 PROCEDURE — 36415 COLL VENOUS BLD VENIPUNCTURE: CPT

## 2021-12-31 ENCOUNTER — APPOINTMENT (OUTPATIENT)
Dept: CT IMAGING | Facility: HOSPITAL | Age: 31
End: 2021-12-31

## 2021-12-31 ENCOUNTER — HOSPITAL ENCOUNTER (EMERGENCY)
Facility: HOSPITAL | Age: 31
Discharge: HOME | End: 2022-01-01
Attending: STUDENT IN AN ORGANIZED HEALTH CARE EDUCATION/TRAINING PROGRAM | Admitting: STUDENT IN AN ORGANIZED HEALTH CARE EDUCATION/TRAINING PROGRAM

## 2021-12-31 DIAGNOSIS — R10.9 FLANK PAIN: Primary | ICD-10-CM

## 2021-12-31 LAB
ALBUMIN SERPL-MCNC: 4.13 G/DL (ref 3.5–5.2)
ALBUMIN/GLOB SERPL: 2 G/DL
ALP SERPL-CCNC: 63 U/L (ref 39–117)
ALT SERPL W P-5'-P-CCNC: 8 U/L (ref 1–33)
ANION GAP SERPL CALCULATED.3IONS-SCNC: 8 MMOL/L (ref 5–15)
AST SERPL-CCNC: 15 U/L (ref 1–32)
B-HCG UR QL: NEGATIVE
BASOPHILS # BLD AUTO: 0.04 10*3/MM3 (ref 0–0.2)
BASOPHILS NFR BLD AUTO: 0.7 % (ref 0–1.5)
BILIRUB SERPL-MCNC: 0.5 MG/DL (ref 0–1.2)
BILIRUB UR QL STRIP: NEGATIVE
BUN SERPL-MCNC: 8 MG/DL (ref 6–20)
BUN/CREAT SERPL: 8.8 (ref 7–25)
CALCIUM SPEC-SCNC: 8.9 MG/DL (ref 8.6–10.5)
CHLORIDE SERPL-SCNC: 104 MMOL/L (ref 98–107)
CLARITY UR: CLEAR
CO2 SERPL-SCNC: 27 MMOL/L (ref 22–29)
COLOR UR: YELLOW
CREAT SERPL-MCNC: 0.91 MG/DL (ref 0.57–1)
CRP SERPL-MCNC: <0.3 MG/DL (ref 0–0.5)
DEPRECATED RDW RBC AUTO: 39.1 FL (ref 37–54)
EOSINOPHIL # BLD AUTO: 0.1 10*3/MM3 (ref 0–0.4)
EOSINOPHIL NFR BLD AUTO: 1.7 % (ref 0.3–6.2)
ERYTHROCYTE [DISTWIDTH] IN BLOOD BY AUTOMATED COUNT: 11.6 % (ref 12.3–15.4)
GFR SERPL CREATININE-BSD FRML MDRD: 73 ML/MIN/1.73
GLOBULIN UR ELPH-MCNC: 2.1 GM/DL
GLUCOSE SERPL-MCNC: 92 MG/DL (ref 65–99)
GLUCOSE UR STRIP-MCNC: NEGATIVE MG/DL
HCT VFR BLD AUTO: 39 % (ref 34–46.6)
HGB BLD-MCNC: 12.8 G/DL (ref 12–15.9)
HGB UR QL STRIP.AUTO: NEGATIVE
IMM GRANULOCYTES # BLD AUTO: 0.01 10*3/MM3 (ref 0–0.05)
IMM GRANULOCYTES NFR BLD AUTO: 0.2 % (ref 0–0.5)
KETONES UR QL STRIP: NEGATIVE
LEUKOCYTE ESTERASE UR QL STRIP.AUTO: NEGATIVE
LYMPHOCYTES # BLD AUTO: 2.33 10*3/MM3 (ref 0.7–3.1)
LYMPHOCYTES NFR BLD AUTO: 39.4 % (ref 19.6–45.3)
MCH RBC QN AUTO: 30.3 PG (ref 26.6–33)
MCHC RBC AUTO-ENTMCNC: 32.8 G/DL (ref 31.5–35.7)
MCV RBC AUTO: 92.2 FL (ref 79–97)
MONOCYTES # BLD AUTO: 0.41 10*3/MM3 (ref 0.1–0.9)
MONOCYTES NFR BLD AUTO: 6.9 % (ref 5–12)
NEUTROPHILS NFR BLD AUTO: 3.03 10*3/MM3 (ref 1.7–7)
NEUTROPHILS NFR BLD AUTO: 51.1 % (ref 42.7–76)
NITRITE UR QL STRIP: NEGATIVE
NRBC BLD AUTO-RTO: 0 /100 WBC (ref 0–0.2)
PH UR STRIP.AUTO: 6.5 [PH] (ref 5–8)
PLATELET # BLD AUTO: 195 10*3/MM3 (ref 140–450)
PMV BLD AUTO: 10.3 FL (ref 6–12)
POTASSIUM SERPL-SCNC: 4 MMOL/L (ref 3.5–5.2)
PROT SERPL-MCNC: 6.2 G/DL (ref 6–8.5)
PROT UR QL STRIP: NEGATIVE
RBC # BLD AUTO: 4.23 10*6/MM3 (ref 3.77–5.28)
SODIUM SERPL-SCNC: 139 MMOL/L (ref 136–145)
SP GR UR STRIP: 1.01 (ref 1–1.03)
UROBILINOGEN UR QL STRIP: NORMAL
WBC NRBC COR # BLD: 5.92 10*3/MM3 (ref 3.4–10.8)

## 2021-12-31 PROCEDURE — 81003 URINALYSIS AUTO W/O SCOPE: CPT | Performed by: PHYSICIAN ASSISTANT

## 2021-12-31 PROCEDURE — 86140 C-REACTIVE PROTEIN: CPT | Performed by: PHYSICIAN ASSISTANT

## 2021-12-31 PROCEDURE — 85025 COMPLETE CBC W/AUTO DIFF WBC: CPT | Performed by: PHYSICIAN ASSISTANT

## 2021-12-31 PROCEDURE — 25010000002 MORPHINE PER 10 MG: Performed by: STUDENT IN AN ORGANIZED HEALTH CARE EDUCATION/TRAINING PROGRAM

## 2021-12-31 PROCEDURE — 99283 EMERGENCY DEPT VISIT LOW MDM: CPT

## 2021-12-31 PROCEDURE — 96375 TX/PRO/DX INJ NEW DRUG ADDON: CPT

## 2021-12-31 PROCEDURE — 96374 THER/PROPH/DIAG INJ IV PUSH: CPT

## 2021-12-31 PROCEDURE — 81025 URINE PREGNANCY TEST: CPT | Performed by: PHYSICIAN ASSISTANT

## 2021-12-31 PROCEDURE — 36415 COLL VENOUS BLD VENIPUNCTURE: CPT

## 2021-12-31 PROCEDURE — 80053 COMPREHEN METABOLIC PANEL: CPT | Performed by: PHYSICIAN ASSISTANT

## 2021-12-31 PROCEDURE — 25010000002 ONDANSETRON PER 1 MG: Performed by: STUDENT IN AN ORGANIZED HEALTH CARE EDUCATION/TRAINING PROGRAM

## 2021-12-31 RX ORDER — MORPHINE SULFATE 2 MG/ML
2 INJECTION, SOLUTION INTRAMUSCULAR; INTRAVENOUS ONCE
Status: COMPLETED | OUTPATIENT
Start: 2021-12-31 | End: 2021-12-31

## 2021-12-31 RX ORDER — KETOROLAC TROMETHAMINE 30 MG/ML
30 INJECTION, SOLUTION INTRAMUSCULAR; INTRAVENOUS EVERY 6 HOURS PRN
Status: DISCONTINUED | OUTPATIENT
Start: 2021-12-31 | End: 2022-01-01 | Stop reason: HOSPADM

## 2021-12-31 RX ORDER — ONDANSETRON 2 MG/ML
4 INJECTION INTRAMUSCULAR; INTRAVENOUS ONCE
Status: COMPLETED | OUTPATIENT
Start: 2021-12-31 | End: 2021-12-31

## 2021-12-31 RX ADMIN — MORPHINE SULFATE 2 MG: 2 INJECTION, SOLUTION INTRAMUSCULAR; INTRAVENOUS at 23:20

## 2021-12-31 RX ADMIN — ONDANSETRON 4 MG: 2 INJECTION INTRAMUSCULAR; INTRAVENOUS at 23:20

## 2022-01-01 ENCOUNTER — APPOINTMENT (OUTPATIENT)
Dept: CT IMAGING | Facility: HOSPITAL | Age: 32
End: 2022-01-01

## 2022-01-01 VITALS
TEMPERATURE: 98.2 F | HEIGHT: 66 IN | BODY MASS INDEX: 23.78 KG/M2 | OXYGEN SATURATION: 100 % | HEART RATE: 91 BPM | DIASTOLIC BLOOD PRESSURE: 72 MMHG | SYSTOLIC BLOOD PRESSURE: 116 MMHG | WEIGHT: 148 LBS | RESPIRATION RATE: 18 BRPM

## 2022-01-01 LAB
HOLD SPECIMEN: NORMAL
HOLD SPECIMEN: NORMAL
WHOLE BLOOD HOLD SPECIMEN: NORMAL
WHOLE BLOOD HOLD SPECIMEN: NORMAL

## 2022-01-01 PROCEDURE — 74176 CT ABD & PELVIS W/O CONTRAST: CPT

## 2022-01-01 RX ORDER — TIZANIDINE 4 MG/1
4 TABLET ORAL EVERY 8 HOURS PRN
Qty: 30 TABLET | Refills: 0 | Status: SHIPPED | OUTPATIENT
Start: 2022-01-01

## 2022-01-01 RX ORDER — DICLOFENAC SODIUM 75 MG/1
75 TABLET, DELAYED RELEASE ORAL 2 TIMES DAILY
Qty: 30 TABLET | Refills: 0 | Status: SHIPPED | OUTPATIENT
Start: 2022-01-01

## 2022-01-01 RX ORDER — PHENAZOPYRIDINE HYDROCHLORIDE 200 MG/1
200 TABLET, FILM COATED ORAL 3 TIMES DAILY PRN
Qty: 21 TABLET | Refills: 0 | Status: SHIPPED | OUTPATIENT
Start: 2022-01-01

## 2022-01-01 NOTE — ED PROVIDER NOTES
Subjective     History provided by:  Patient  Abdominal Pain  Pain location:  L flank  Pain quality: aching and stabbing    Pain radiates to:  Suprapubic region  Pain severity:  Moderate  Onset quality:  Sudden  Duration:  2 days  Timing:  Constant  Progression:  Worsening  Chronicity:  Recurrent  Relieved by:  Nothing  Associated symptoms: no chest pain, no dysuria and no fever        Review of Systems   Constitutional: Negative.  Negative for fever.   HENT: Negative.    Respiratory: Negative.    Cardiovascular: Negative.  Negative for chest pain.   Gastrointestinal: Positive for abdominal pain.   Endocrine: Negative.    Genitourinary: Positive for frequency. Negative for dysuria.   Skin: Negative.    Neurological: Negative.    Psychiatric/Behavioral: Negative.    All other systems reviewed and are negative.      Past Medical History:   Diagnosis Date   • Anxiety    • Back pain    • Constipation    • Depression    • Endometriosis    • Heart murmur    • Heart murmur    • Infection, Shigella    • Migraines    • Ovarian cyst    • Pain     PELVIC   • PCOS (polycystic ovarian syndrome)    • Pelvic pain    • Wrist fracture        No Known Allergies    Past Surgical History:   Procedure Laterality Date   • ABDOMINAL SURGERY     •  SECTION     • CYSTOSCOPY BLADDER HYDRODISTENSION N/A 10/21/2020    Procedure: CYSTOSCOPY BLADDER HYDRODISTENSION;  Surgeon: Reji Baum MD;  Location: [facility];  Service: Urology;  Laterality: N/A;   • DIAGNOSTIC LAPAROSCOPY     • DIAGNOSTIC LAPAROSCOPY N/A 2016    Procedure: DIAGNOSTIC LAPAROSCOPY IUD REMOVAL, FULGERATION OF ENDOMETRIOSIS;  Surgeon: Chapin Russell DO;  Location: [facility];  Service:    • DIAGNOSTIC LAPAROSCOPY N/A 10/9/2019    Procedure: LAPAROSCOPY lysis of adhesions;  Surgeon: Chapin Russell DO;  Location: [facility] OR;  Service: Obstetrics/Gynecology       Family History   Problem Relation Age of Onset   • No Known Problems  Father    • No Known Problems Mother    • Diabetes Maternal Grandmother    • Kidney disease Maternal Grandfather    • Heart disease Maternal Grandfather        Social History     Socioeconomic History   • Marital status:    Tobacco Use   • Smoking status: Never Smoker   • Smokeless tobacco: Never Used   Substance and Sexual Activity   • Alcohol use: No   • Drug use: No   • Sexual activity: Yes     Partners: Male     Birth control/protection: I.U.D.           Objective   Physical Exam  Vitals and nursing note reviewed.   Constitutional:       General: She is not in acute distress.     Appearance: She is well-developed. She is not diaphoretic.   HENT:      Head: Normocephalic and atraumatic.      Right Ear: External ear normal.      Left Ear: External ear normal.      Nose: Nose normal.   Eyes:      Conjunctiva/sclera: Conjunctivae normal.   Neck:      Vascular: No JVD.      Trachea: No tracheal deviation.   Cardiovascular:      Rate and Rhythm: Normal rate.      Heart sounds: No murmur heard.      Pulmonary:      Effort: Pulmonary effort is normal. No respiratory distress.      Breath sounds: No wheezing.   Abdominal:      General: Bowel sounds are normal.      Palpations: Abdomen is soft.      Tenderness: There is abdominal tenderness in the suprapubic area. There is left CVA tenderness.   Musculoskeletal:         General: No deformity. Normal range of motion.      Cervical back: Normal range of motion and neck supple.   Skin:     General: Skin is warm and dry.      Coloration: Skin is not pale.      Findings: No erythema or rash.   Neurological:      Mental Status: She is alert and oriented to person, place, and time.      Cranial Nerves: No cranial nerve deficit.   Psychiatric:         Behavior: Behavior normal.         Thought Content: Thought content normal.         Procedures           ED Course  ED Course as of 01/01/22 0435   Sat Jan 01, 2022   0026 CT abd pelvis rad interpreted:  2 mm nonobstructing  right renal stone     Minimal free fluid pelvis is probably physiologic in a patient this age     Otherwise normal [RB]      ED Course User Index  [RB] Chris Her II, PA                                                 MDM  Number of Diagnoses or Management Options  Flank pain: new and requires workup     Amount and/or Complexity of Data Reviewed  Clinical lab tests: ordered and reviewed  Tests in the radiology section of CPT®: ordered and reviewed    Risk of Complications, Morbidity, and/or Mortality  Presenting problems: moderate  Diagnostic procedures: moderate  Management options: low    Patient Progress  Patient progress: stable      Final diagnoses:   Flank pain       ED Disposition  ED Disposition     ED Disposition Condition Comment    Discharge Stable           Jessica Rivera MD  [address] [id number] [phone]    Schedule an appointment as soon as possible for a visit       Reji Baum MD  [address] [id number] [phone]    Schedule an appointment as soon as possible for a visit            Medication List      New Prescriptions    diclofenac 75 MG EC tablet  Commonly known as: VOLTAREN  Take 1 tablet by mouth 2 (Two) Times a Day.     phenazopyridine 200 MG tablet  Commonly known as: PYRIDIUM  Take 1 tablet by mouth 3 (Three) Times a Day As Needed for Bladder Spasms.     tiZANidine 4 MG tablet  Commonly known as: ZANAFLEX  Take 1 tablet by mouth Every 8 (Eight) Hours As Needed (pain).           Where to Get Your Medications      You can get these medications from any pharmacy    Bring a paper prescription for each of these medications  · diclofenac 75 MG EC tablet  · phenazopyridine 200 MG tablet  · tiZANidine 4 MG tablet          Chris Her II, PA  01/01/22 6915

## 2022-01-01 NOTE — ED NOTES
MEDICAL SCREENING:    Reason for Visit: flank pain    Patient initially seen in triage.  The patient was advised further evaluation and diagnostic testing will be needed, some of the treatment and testing will be initiated in the lobby in order to begin the process.  The patient will be returned to the waiting area for the time being and possibly be re-assessed by a subsequent ED provider.  The patient will be brought back to the treatment area in as timely manner as possible.    ]     Chris Her II, PA  12/31/21 9491    
Yes

## 2022-01-06 ENCOUNTER — OFFICE VISIT (OUTPATIENT)
Dept: UROLOGY | Facility: CLINIC | Age: 32
End: 2022-01-06

## 2022-01-06 VITALS — WEIGHT: 148 LBS | HEIGHT: 66 IN | BODY MASS INDEX: 23.78 KG/M2

## 2022-01-06 DIAGNOSIS — E83.59 NEPHROCALCINOSIS: Primary | ICD-10-CM

## 2022-01-06 DIAGNOSIS — N29 NEPHROCALCINOSIS: Primary | ICD-10-CM

## 2022-01-06 PROCEDURE — 99213 OFFICE O/P EST LOW 20 MIN: CPT | Performed by: UROLOGY

## 2022-01-06 RX ORDER — HYDROCODONE BITARTRATE AND ACETAMINOPHEN 10; 325 MG/1; MG/1
1 TABLET ORAL EVERY 6 HOURS PRN
Qty: 10 TABLET | Refills: 0 | Status: SHIPPED | OUTPATIENT
Start: 2022-01-06

## 2022-01-06 RX ORDER — PROMETHAZINE HYDROCHLORIDE 25 MG/1
25 TABLET ORAL EVERY 6 HOURS PRN
Qty: 21 TABLET | Refills: 2 | Status: SHIPPED | OUTPATIENT
Start: 2022-01-06

## 2022-01-06 RX ORDER — TAMSULOSIN HYDROCHLORIDE 0.4 MG/1
1 CAPSULE ORAL NIGHTLY
Qty: 30 CAPSULE | Refills: 5 | Status: SHIPPED | OUTPATIENT
Start: 2022-01-06

## 2022-01-06 NOTE — PROGRESS NOTES
Chief Complaint:          Chief Complaint   Patient presents with   • Nephrolithiasis       HPI:   31 y.o. female returns today well-known to me passed a 2 mm stone reviewed her CT discussed the situation we discussed increased fluid etc. currently she is stone free      Past Medical History:        Past Medical History:   Diagnosis Date   • Anxiety    • Back pain    • Constipation    • Depression    • Endometriosis    • Heart murmur    • Heart murmur    • Infection, Shigella    • Migraines    • Ovarian cyst    • Pain     PELVIC   • PCOS (polycystic ovarian syndrome)    • Pelvic pain    • Wrist fracture          Current Meds:     Current Outpatient Medications   Medication Sig Dispense Refill   • diclofenac (VOLTAREN) 75 MG EC tablet Take 1 tablet by mouth 2 (Two) Times a Day. 30 tablet 0   • escitalopram (LEXAPRO) 20 MG tablet Take 20 mg by mouth Daily.     • linaclotide (LINZESS) 290 MCG capsule capsule Take 290 mcg by mouth Every Morning Before Breakfast.     • phenazopyridine (PYRIDIUM) 200 MG tablet Take 1 tablet by mouth 3 (Three) Times a Day As Needed for Bladder Spasms. 21 tablet 0   • probiotic (CULTURELLE) capsule capsule Take 1 capsule by mouth Daily. 90 capsule 3   • tiZANidine (ZANAFLEX) 4 MG tablet Take 1 tablet by mouth Every 8 (Eight) Hours As Needed (pain). 30 tablet 0   • traZODone (DESYREL) 150 MG tablet Take 150 mg by mouth Every Night.       No current facility-administered medications for this visit.        Allergies:      No Known Allergies     Past Surgical History:     Past Surgical History:   Procedure Laterality Date   • ABDOMINAL SURGERY     •  SECTION     • CYSTOSCOPY BLADDER HYDRODISTENSION N/A 10/21/2020    Procedure: CYSTOSCOPY BLADDER HYDRODISTENSION;  Surgeon: Reji Baum MD;  Location: [facility];  Service: Urology;  Laterality: N/A;   • DIAGNOSTIC LAPAROSCOPY     • DIAGNOSTIC LAPAROSCOPY N/A 2016    Procedure: DIAGNOSTIC LAPAROSCOPY IUD REMOVAL,  FULGERATION OF ENDOMETRIOSIS;  Surgeon: Chapin Russell DO;  Location: [facility] OR;  Service:    • DIAGNOSTIC LAPAROSCOPY N/A 10/9/2019    Procedure: LAPAROSCOPY lysis of adhesions;  Surgeon: Chapin Russell DO;  Location: [facility] OR;  Service: Obstetrics/Gynecology         Social History:     Social History     Socioeconomic History   • Marital status:    Tobacco Use   • Smoking status: Never Smoker   • Smokeless tobacco: Never Used   Substance and Sexual Activity   • Alcohol use: No   • Drug use: No   • Sexual activity: Yes     Partners: Male     Birth control/protection: I.U.D.       Family History:     Family History   Problem Relation Age of Onset   • No Known Problems Father    • No Known Problems Mother    • Diabetes Maternal Grandmother    • Kidney disease Maternal Grandfather    • Heart disease Maternal Grandfather        Review of Systems:     Review of Systems   Constitutional: Negative.  Negative for activity change, appetite change, chills, diaphoresis, fatigue and unexpected weight change.   HENT: Negative for congestion, dental problem, drooling, ear discharge, ear pain, facial swelling, hearing loss, mouth sores, nosebleeds, postnasal drip, rhinorrhea, sinus pressure, sneezing, sore throat, tinnitus, trouble swallowing and voice change.    Eyes: Negative.  Negative for photophobia, pain, discharge, redness, itching and visual disturbance.   Respiratory: Negative.  Negative for apnea, cough, choking, chest tightness, shortness of breath, wheezing and stridor.    Cardiovascular: Negative.  Negative for chest pain, palpitations and leg swelling.   Gastrointestinal: Negative.  Negative for abdominal distention, abdominal pain, anal bleeding, blood in stool, constipation, diarrhea, nausea, rectal pain and vomiting.   Endocrine: Negative.  Negative for cold intolerance, heat intolerance, polydipsia, polyphagia and polyuria.   Musculoskeletal: Negative.  Negative for arthralgias, back  pain, gait problem, joint swelling, myalgias, neck pain and neck stiffness.   Skin: Negative.  Negative for color change, pallor, rash and wound.   Allergic/Immunologic: Negative.  Negative for environmental allergies, food allergies and immunocompromised state.   Neurological: Negative.  Negative for dizziness, tremors, seizures, syncope, facial asymmetry, speech difficulty, weakness, light-headedness, numbness and headaches.   Hematological: Negative.  Negative for adenopathy. Does not bruise/bleed easily.   Psychiatric/Behavioral: Negative for agitation, behavioral problems, confusion, decreased concentration, dysphoric mood, hallucinations, self-injury, sleep disturbance and suicidal ideas. The patient is not nervous/anxious and is not hyperactive.    All other systems reviewed and are negative.      Physical Exam:     Physical Exam  Constitutional:       Appearance: She is well-developed.   HENT:      Head: Normocephalic and atraumatic.      Right Ear: External ear normal.      Left Ear: External ear normal.   Eyes:      Conjunctiva/sclera: Conjunctivae normal.      Pupils: Pupils are equal, round, and reactive to light.   Cardiovascular:      Rate and Rhythm: Normal rate and regular rhythm.      Heart sounds: Normal heart sounds.   Pulmonary:      Effort: Pulmonary effort is normal.      Breath sounds: Normal breath sounds.   Abdominal:      General: Bowel sounds are normal. There is no distension.      Palpations: Abdomen is soft. There is no mass.      Tenderness: There is no abdominal tenderness. There is no guarding or rebound.   Genitourinary:     Vagina: No vaginal discharge.   Musculoskeletal:         General: Normal range of motion.   Skin:     General: Skin is warm and dry.   Neurological:      Mental Status: She is alert.      Deep Tendon Reflexes: Reflexes are normal and symmetric.   Psychiatric:         Behavior: Behavior normal.         Thought Content: Thought content normal.         Judgment:  Judgment normal.         I have reviewed the following portions of the patient's history: Allergies, current medications, past family history, past medical history, past social history, past surgical history, problem list, and ROS and confirm it is accurate.      Procedure:       Assessment/Plan:   Ureteral calculus-patient has been diagnosed with a ureteral calculus.  We have discussed the various parameters regarding spontaneous passage including the notion that a 2 mm stone has a high likelihood of spontaneous passage versus a larger stone being caught up in the upper areas of the urinary tract.  We also discussed the medical management of stone disease and the use of medical expulsive therapy in the form of Flomax.  This is used in an off label setting.  We discussed the indicators for intervention including  absolute indicators such as sepsis and uncontrollable severe pain, as well as the relative indicators of moderate pain that is well-controlled with various analgesia.  I also talked about nonoperative management including ambulation and increasing fluids and hot tub as being an effective adjuncts in the treatment of a ureteral stone.  She had a 2 mm stone she passed we discussed metabolic factors  Narcotic pain medication-patient has significant acute pain that I believe would be an indication for the use of narcotic pain medication.  I discussed the significant risks of pain medication and the fact that this will be a short only option and I will give her no more than a three-day supply of pain medication, I will not plan long-term medication, and that this will be sent to a pain clinic if at all becomes necessary.  We discussed signing a pain medication agreement and the fact that we're going to run a state [facility] review to be sure the patient is not getting pain medication from elsewhere.  If this is the case, we will not give pain medication as part of the patient's treatment plan of there being  prescribed a controlled substance with potential for abuse.  This patient has been well aware of the appropriate dose of such medications including the risks for somnolence, limited ability to drive and/or safety and the significant potential for overdose.  It has been made clear that these medications are for the prescribed patient only without concomitant use of alcohol or other substance unless prescribed by the medical provider.  Has completed prescribing agreement detailing the terms of continue prescribing him a controlled substance including monitoring [place] reports, the possibility of urine drug screens, and pill counts.  The patient is aware that we review [facility] reports on a regular basis and scan them into the chart.  History and physical examination exhibited continued safe and appropriate use of controlled substances. We also discussed the fact that the new [place] legislation allows only a three-day prescription for pain medication.  In this situation he will be referred to a chronic pain clinic.        This document has been electronically signed by MARYANN COLBERT MD January 6, 2022 09:07 EST

## 2023-12-21 ENCOUNTER — HOSPITAL ENCOUNTER (OUTPATIENT)
Facility: HOSPITAL | Age: 33
Discharge: HOME | End: 2023-12-21
Payer: COMMERCIAL

## 2023-12-21 ENCOUNTER — TRANSCRIBE ORDERS (OUTPATIENT)
Dept: ADMINISTRATIVE | Facility: HOSPITAL | Age: 33
End: 2023-12-21
Payer: COMMERCIAL

## 2023-12-21 DIAGNOSIS — R01.1 HEART MURMUR: ICD-10-CM

## 2023-12-21 DIAGNOSIS — R01.1 HEART MURMUR: Primary | ICD-10-CM

## 2023-12-21 LAB
BH CV ECHO MEAS - AO MAX PG: 9.6 MMHG
BH CV ECHO MEAS - AO V2 MAX: 154.8 CM/SEC
BH CV ECHO MEAS - AO V2 VTI: 28.1 CM
BH CV ECHO MEAS - EDV(CUBED): 54.2 ML
BH CV ECHO MEAS - EDV(MOD-SP2): 105.1 ML
BH CV ECHO MEAS - EDV(MOD-SP4): 61.3 ML
BH CV ECHO MEAS - EF(MOD-BP): 59 %
BH CV ECHO MEAS - EF(MOD-SP2): 60.8 %
BH CV ECHO MEAS - EF(MOD-SP4): 59.6 %
BH CV ECHO MEAS - ESV(CUBED): 9.4 ML
BH CV ECHO MEAS - ESV(MOD-SP2): 41.2 ML
BH CV ECHO MEAS - ESV(MOD-SP4): 24.8 ML
BH CV ECHO MEAS - FS: 44.3 %
BH CV ECHO MEAS - IVS/LVPW: 1.59 CM
BH CV ECHO MEAS - IVSD: 1.25 CM
BH CV ECHO MEAS - LA DIMENSION: 3.4 CM
BH CV ECHO MEAS - LV DIASTOLIC VOL/BSA (35-75): 34.8 CM2
BH CV ECHO MEAS - LV MASS(C)D: 120.2 GRAMS
BH CV ECHO MEAS - LV SYSTOLIC VOL/BSA (12-30): 14.1 CM2
BH CV ECHO MEAS - LVIDD: 3.8 CM
BH CV ECHO MEAS - LVIDS: 2.11 CM
BH CV ECHO MEAS - LVPWD: 0.79 CM
BH CV ECHO MEAS - MV A MAX VEL: 64.5 CM/SEC
BH CV ECHO MEAS - MV DEC SLOPE: 599 CM/SEC2
BH CV ECHO MEAS - MV E MAX VEL: 107.6 CM/SEC
BH CV ECHO MEAS - MV E/A: 1.67
BH CV ECHO MEAS - PA ACC TIME: 0.14 SEC
BH CV ECHO MEAS - PA V2 MAX: 99.4 CM/SEC
BH CV ECHO MEAS - SI(MOD-SP2): 36.3 ML/M2
BH CV ECHO MEAS - SI(MOD-SP4): 20.7 ML/M2
BH CV ECHO MEAS - SV(MOD-SP2): 63.8 ML
BH CV ECHO MEAS - SV(MOD-SP4): 36.5 ML
BH CV ECHO MEAS - TR MAX PG: 34.7 MMHG
BH CV ECHO MEAS - TR MAX VEL: 293.3 CM/SEC

## 2023-12-21 PROCEDURE — 93306 TTE W/DOPPLER COMPLETE: CPT

## 2024-02-06 ENCOUNTER — OFFICE VISIT (OUTPATIENT)
Age: 34
End: 2024-02-06
Payer: COMMERCIAL

## 2024-02-06 VITALS
HEART RATE: 78 BPM | HEIGHT: 65 IN | WEIGHT: 138 LBS | SYSTOLIC BLOOD PRESSURE: 129 MMHG | OXYGEN SATURATION: 100 % | BODY MASS INDEX: 22.99 KG/M2 | DIASTOLIC BLOOD PRESSURE: 88 MMHG

## 2024-02-06 DIAGNOSIS — R60.0 BILATERAL LEG EDEMA: Primary | ICD-10-CM

## 2024-02-06 DIAGNOSIS — I27.20 MILD PULMONARY HYPERTENSION: ICD-10-CM

## 2024-02-06 DIAGNOSIS — R06.09 DYSPNEA ON EXERTION: ICD-10-CM

## 2024-02-06 DIAGNOSIS — R07.2 PRECORDIAL PAIN: ICD-10-CM

## 2024-02-06 PROCEDURE — 93000 ELECTROCARDIOGRAM COMPLETE: CPT | Performed by: INTERNAL MEDICINE

## 2024-02-06 PROCEDURE — 99204 OFFICE O/P NEW MOD 45 MIN: CPT | Performed by: INTERNAL MEDICINE

## 2024-02-06 RX ORDER — FUROSEMIDE 20 MG/1
20 TABLET ORAL 2 TIMES DAILY
COMMUNITY
End: 2024-02-06 | Stop reason: ALTCHOICE

## 2024-02-06 RX ORDER — CYCLOBENZAPRINE HCL 10 MG
10 TABLET ORAL 3 TIMES DAILY PRN
COMMUNITY

## 2024-02-06 RX ORDER — BUMETANIDE 1 MG/1
2 TABLET ORAL DAILY
Qty: 30 TABLET | Refills: 1 | Status: SHIPPED | OUTPATIENT
Start: 2024-02-06

## 2024-02-06 NOTE — LETTER
February 6, 2024     Geetacharlie GeorgesALEX  [address] [facility] [address]    Patient: Latisha Pedraza   YOB: 1990   Date of Visit: 2/6/2024     Dear Geeta:       Thank you for referring Latisha Pedraza to me for evaluation. Below are the relevant portions of my assessment and plan of care.    If you have questions, please do not hesitate to call me. I look forward to following Latisha along with you.         Sincerely,        Yang Oleary MD        CC: No Recipients    Yang Oleary MD  02/06/24 1847  Sign when Signing Visit  Geeta Georges  1990  02/06/2024    Patient Active Problem List   Diagnosis   • Pregnancy   • Irritable bowel syndrome with constipation   • Recurrent UTI   • Nephrocalcinosis   • Pleural effusion, bilateral   • Interstitial cystitis       Dear Geeta:    Subjective    Latisha Pedraza is a 33 y.o. female with the problems as listed above, presents    Chief complaint: Progressive shortness of breath and leg edema over the last 2 to 3 months.  Intermittent chest tightness.    History of Present Illness: Latisha Bello is a pleasant 33-year-old  female with no history of known heart disease, nonhypertensive and nondiabetic, presents with complaints of having progressive shortness of breath and bilateral leg edema over the last 2 to 3 months.  She reported has gained about 10 pounds since this time..  She had a recent echo Doppler study that revealed normal LV wall motion and systolic function with an estimated LV ejection fraction of about 56 to 60% with evidence of mild pulm hypertension.  There was no significant valvular disease noted.  She denies any history of DVT or pulm embolus.  She has no history of known asthma or chronic lung disease.  She has no history of sleep apnea.  She does complain of some intermittent episodes of chest tightness that seem to occur at random and resolve spontaneously.  Her EKG in the office today reveals normal  sinus rhythm and is within normal limits.  She is a non-smoker.  She has been placed on a furosemide 20 mg once a day which apparently has not helped much with her swelling.  She also complains of some bluish discoloration of her toes at times.  She has intermittent two-pillow orthopnea as well.  Her mom reportedly had heart attack in her 20s.    Complete Transthoracic Echocardiogram with Complete Doppler and Color Flow    Accession Number: [id number]   Date of Study: 12/21/23   Ordering Provider: Brianna Ferris APRN   Clinical Indications: Chest Pain        Interpreting Physicians  Performing Staff   Karlee Marroquin MD Tech: Brady Zamora        Clinical Indication    Chest Pain   Dx: Heart murmur [R01.1 (ICD-10-CM)]     Interpretation Summary   •  Left ventricular systolic function is normal. Calculated left ventricular EF = 59% Left ventricular ejection fraction appears to be 56 - 60%.  •  Left ventricular diastolic function was normal.  •  Estimated right ventricular systolic pressure from tricuspid regurgitation is mildly elevated (35-45 mmHg).  •  Mild pulmonary hypertension is present.      No Known Allergies:    Current Outpatient Medications:   •  cyclobenzaprine (FLEXERIL) 10 MG tablet, Take 1 tablet by mouth 3 (Three) Times a Day As Needed for Muscle Spasms., Disp: , Rfl:   •  escitalopram (LEXAPRO) 20 MG tablet, Take 1 tablet by mouth Daily., Disp: , Rfl:   •  furosemide (LASIX) 20 MG tablet, Take 1 tablet by mouth 2 (Two) Times a Day., Disp: , Rfl:   •  promethazine (PHENERGAN) 25 MG tablet, Take 1 tablet by mouth Every 6 (Six) Hours As Needed for Nausea or Vomiting., Disp: 21 tablet, Rfl: 2    Past Medical History:   Diagnosis Date   • Anxiety    • Back pain    • Constipation    • Depression    • Endometriosis    • Heart murmur    • Heart murmur    • Infection, Shigella 2005   • Migraines    • Ovarian cyst    • Pain     PELVIC   • PCOS (polycystic ovarian syndrome)    • Pelvic pain    •  "Wrist fracture      Past Surgical History:   Procedure Laterality Date   • ABDOMINAL SURGERY     •  SECTION     • CYSTOSCOPY BLADDER HYDRODISTENSION N/A 10/21/2020    Procedure: CYSTOSCOPY BLADDER HYDRODISTENSION;  Surgeon: Reji Baum MD;  Location: [facility];  Service: Urology;  Laterality: N/A;   • DIAGNOSTIC LAPAROSCOPY     • DIAGNOSTIC LAPAROSCOPY N/A 2016    Procedure: DIAGNOSTIC LAPAROSCOPY IUD REMOVAL, FULGERATION OF ENDOMETRIOSIS;  Surgeon: Chapin Russell DO;  Location: [facility] OR;  Service:    • DIAGNOSTIC LAPAROSCOPY N/A 10/9/2019    Procedure: LAPAROSCOPY lysis of adhesions;  Surgeon: Chapin Russell DO;  Location: [facility] OR;  Service: Obstetrics/Gynecology     Family History   Problem Relation Age of Onset   • No Known Problems Father    • No Known Problems Mother    • Diabetes Maternal Grandmother    • Kidney disease Maternal Grandfather    • Heart disease Maternal Grandfather      Social History     Tobacco Use   • Smoking status: Never   • Smokeless tobacco: Never   Vaping Use   • Vaping Use: Never used   Substance Use Topics   • Alcohol use: No   • Drug use: No     Review of Systems   Constitutional: Positive for malaise/fatigue.   Eyes:  Positive for blurred vision and double vision.   Cardiovascular:  Positive for chest pain, leg swelling and palpitations.   Respiratory:  Positive for shortness of breath.    Endocrine: Positive for polydipsia.   Musculoskeletal:  Positive for back pain, joint pain, joint swelling, muscle cramps, muscle weakness, myalgias and stiffness.   Gastrointestinal:  Positive for change in bowel habit, heartburn, nausea and vomiting.   Genitourinary: Negative.    Neurological:  Positive for dizziness, headaches and numbness.   Psychiatric/Behavioral:  Positive for depression and memory loss. The patient has insomnia and is nervous/anxious.      Objective  Blood pressure 129/88, pulse 78, height 165.1 cm (65\"), weight 62.6 kg (138 " "lb), SpO2 100%, not currently breastfeeding.  Body mass index is 22.96 kg/m².    Vitals reviewed.   Constitutional:       Appearance: Well-developed.   Eyes:      Conjunctiva/sclera: Conjunctivae normal.   HENT:      Head: Normocephalic.   Neck:      Thyroid: No thyromegaly.      Vascular: No JVD.      Trachea: No tracheal deviation.   Pulmonary:      Effort: No respiratory distress.      Breath sounds: Normal breath sounds. No wheezing. No rales.   Cardiovascular:      PMI at left midclavicular line. Normal rate. Regular rhythm. Normal S1. Normal S2.       Murmurs: There is no murmur.      No gallop.  No click. No rub.   Pulses:     Carotid: 2+ bilaterally.     Radial: 2+ bilaterally.     Dorsalis pedis: 2+ bilaterally.     Posterior tibial: 2+ bilaterally.  Edema:     Pretibial: bilateral 2+ edema of the pretibial area.     Ankle: bilateral 2+ edema of the ankle.     Feet: bilateral 2+ edema of the feet.  Abdominal:      General: Bowel sounds are normal.      Palpations: Abdomen is soft. There is no abdominal mass.      Tenderness: There is no abdominal tenderness.   Musculoskeletal:      Cervical back: Normal range of motion and neck supple. Skin:     General: Skin is warm and dry.   Neurological:      Mental Status: Alert and oriented to person, place, and time.      Cranial Nerves: No cranial nerve deficit.         Lab Results   Component Value Date     12/31/2021    K 4.0 12/31/2021     12/31/2021    CO2 27.0 12/31/2021    BUN 8 12/31/2021    CREATININE 0.91 12/31/2021    GLUCOSE 92 12/31/2021    CALCIUM 8.9 12/31/2021    AST 15 12/31/2021    ALT 8 12/31/2021    ALKPHOS 63 12/31/2021     No results found for: \"CKTOTAL\"  Lab Results   Component Value Date    WBC 5.92 12/31/2021    HGB 12.8 12/31/2021    HCT 39.0 12/31/2021     12/31/2021       ECG 12 Lead    Date/Time: 2/6/2024 2:10 PM  Performed by: Yang Oleary MD    Authorized by: Yang Oleary MD  Comparison: compared with previous " ECG from 12/10/2020  Similar to previous ECG  Rhythm: sinus rhythm  Conduction: conduction normal  ST Segments: ST segments normal  T Waves: T waves normal    Clinical impression: normal ECG            Assessment & Plan   Diagnosis Plan   1. Bilateral leg edema  Duplex Venous Lower Extremity - Bilateral CAR      2. Precordial pain  Treadmill Stress Test      3. Dyspnea on exertion        4. Mild pulmonary hypertension            Recommendations  Orders Placed This Encounter   Procedures   • Treadmill Stress Test   • ECG 12 Lead        We will evaluate her leg edema with venous Doppler of the lower extremities to rule out DVT.  Will add low-dose aspirin for her chest pains and evaluate further with a treadmill stress EKG test  Will change her diuretic from furosemide to bumetanide 2 mg daily for 3 days and then 1 mg daily after that.  I have asked her to cut back on salt rich foods such as pickles, all chips and sausages, hot dogs and hamburgers and cheeseburgers etc.  Check CMP and magnesium level    Return in about 3 weeks (around 2/27/2024).    As always, Geeta  I appreciate very much the opportunity to participate in the cardiovascular care of your patients. Please do not hesitate to call me with any questions with regards to Latisha Pedraza's evaluation and management.     With Best Regards,        Yang Oleary MD, [facility]    Please note that portions of this note were completed with a voice recognition program.

## 2024-02-06 NOTE — PROGRESS NOTES
Geeta Georges  Latisha Pedraza  1990  02/06/2024    Patient Active Problem List   Diagnosis    Pregnancy    Irritable bowel syndrome with constipation    Recurrent UTI    Nephrocalcinosis    Pleural effusion, bilateral    Interstitial cystitis       Dear Geeta Georges:    Subjective     Latisha Pedraza is a 33 y.o. female with the problems as listed above, presents    Chief complaint: Progressive shortness of breath and leg edema over the last 2 to 3 months.  Intermittent chest tightness.    History of Present Illness: Latisha Bello is a pleasant 33-year-old  female with no history of known heart disease, nonhypertensive and nondiabetic, presents with complaints of having progressive shortness of breath and bilateral leg edema over the last 2 to 3 months.  She reported has gained about 10 pounds since this time..  She had a recent echo Doppler study that revealed normal LV wall motion and systolic function with an estimated LV ejection fraction of about 56 to 60% with evidence of mild pulm hypertension.  There was no significant valvular disease noted.  She denies any history of DVT or pulm embolus.  She has no history of known asthma or chronic lung disease.  She has no history of sleep apnea.  She does complain of some intermittent episodes of chest tightness that seem to occur at random and resolve spontaneously.  Her EKG in the office today reveals normal sinus rhythm and is within normal limits.  She is a non-smoker.  She has been placed on a furosemide 20 mg once a day which apparently has not helped much with her swelling.  She also complains of some bluish discoloration of her toes at times.  She has intermittent two-pillow orthopnea as well.  Her mom reportedly had heart attack in her 20s.    Complete Transthoracic Echocardiogram with Complete Doppler and Color Flow    Accession Number: [id number]   Date of Study: 12/21/23   Ordering Provider: Brianna Ferris APRN Clinical  Indications: Chest Pain        Interpreting Physicians  Performing Staff   Karlee Marroquin MD Tech: Shahidon, Brady D        Clinical Indication    Chest Pain   Dx: Heart murmur [R01.1 (ICD-10-CM)]     Interpretation Summary     Left ventricular systolic function is normal. Calculated left ventricular EF = 59% Left ventricular ejection fraction appears to be 56 - 60%.    Left ventricular diastolic function was normal.    Estimated right ventricular systolic pressure from tricuspid regurgitation is mildly elevated (35-45 mmHg).    Mild pulmonary hypertension is present.      No Known Allergies:    Current Outpatient Medications:     cyclobenzaprine (FLEXERIL) 10 MG tablet, Take 1 tablet by mouth 3 (Three) Times a Day As Needed for Muscle Spasms., Disp: , Rfl:     escitalopram (LEXAPRO) 20 MG tablet, Take 1 tablet by mouth Daily., Disp: , Rfl:     furosemide (LASIX) 20 MG tablet, Take 1 tablet by mouth 2 (Two) Times a Day., Disp: , Rfl:     promethazine (PHENERGAN) 25 MG tablet, Take 1 tablet by mouth Every 6 (Six) Hours As Needed for Nausea or Vomiting., Disp: 21 tablet, Rfl: 2    Past Medical History:   Diagnosis Date    Anxiety     Back pain     Constipation     Depression     Endometriosis     Heart murmur     Heart murmur     Infection, Shigella 2005    Migraines     Ovarian cyst     Pain     PELVIC    PCOS (polycystic ovarian syndrome)     Pelvic pain     Wrist fracture      Past Surgical History:   Procedure Laterality Date    ABDOMINAL SURGERY       SECTION      CYSTOSCOPY BLADDER HYDRODISTENSION N/A 10/21/2020    Procedure: CYSTOSCOPY BLADDER HYDRODISTENSION;  Surgeon: Reji Baum MD;  Location: [facility];  Service: Urology;  Laterality: N/A;    DIAGNOSTIC LAPAROSCOPY      DIAGNOSTIC LAPAROSCOPY N/A 2016    Procedure: DIAGNOSTIC LAPAROSCOPY IUD REMOVAL, FULGERATION OF ENDOMETRIOSIS;  Surgeon: Chapin Russell DO;  Location: [facility];  Service:     DIAGNOSTIC LAPAROSCOPY N/A  "10/9/2019    Procedure: LAPAROSCOPY lysis of adhesions;  Surgeon: Chapin Russell DO;  Location: [facility] OR;  Service: Obstetrics/Gynecology     Family History   Problem Relation Age of Onset    No Known Problems Father     No Known Problems Mother     Diabetes Maternal Grandmother     Kidney disease Maternal Grandfather     Heart disease Maternal Grandfather      Social History     Tobacco Use    Smoking status: Never    Smokeless tobacco: Never   Vaping Use    Vaping Use: Never used   Substance Use Topics    Alcohol use: No    Drug use: No     Review of Systems   Constitutional: Positive for malaise/fatigue.   Eyes:  Positive for blurred vision and double vision.   Cardiovascular:  Positive for chest pain, leg swelling and palpitations.   Respiratory:  Positive for shortness of breath.    Endocrine: Positive for polydipsia.   Musculoskeletal:  Positive for back pain, joint pain, joint swelling, muscle cramps, muscle weakness, myalgias and stiffness.   Gastrointestinal:  Positive for change in bowel habit, heartburn, nausea and vomiting.   Genitourinary: Negative.    Neurological:  Positive for dizziness, headaches and numbness.   Psychiatric/Behavioral:  Positive for depression and memory loss. The patient has insomnia and is nervous/anxious.      Objective   Blood pressure 129/88, pulse 78, height 165.1 cm (65\"), weight 62.6 kg (138 lb), SpO2 100%, not currently breastfeeding.  Body mass index is 22.96 kg/m².    Vitals reviewed.   Constitutional:       Appearance: Well-developed.   Eyes:      Conjunctiva/sclera: Conjunctivae normal.   HENT:      Head: Normocephalic.   Neck:      Thyroid: No thyromegaly.      Vascular: No JVD.      Trachea: No tracheal deviation.   Pulmonary:      Effort: No respiratory distress.      Breath sounds: Normal breath sounds. No wheezing. No rales.   Cardiovascular:      PMI at left midclavicular line. Normal rate. Regular rhythm. Normal S1. Normal S2.       Murmurs: There is no " "murmur.      No gallop.  No click. No rub.   Pulses:     Carotid: 2+ bilaterally.     Radial: 2+ bilaterally.     Dorsalis pedis: 2+ bilaterally.     Posterior tibial: 2+ bilaterally.  Edema:     Pretibial: bilateral 2+ edema of the pretibial area.     Ankle: bilateral 2+ edema of the ankle.     Feet: bilateral 2+ edema of the feet.  Abdominal:      General: Bowel sounds are normal.      Palpations: Abdomen is soft. There is no abdominal mass.      Tenderness: There is no abdominal tenderness.   Musculoskeletal:      Cervical back: Normal range of motion and neck supple. Skin:     General: Skin is warm and dry.   Neurological:      Mental Status: Alert and oriented to person, place, and time.      Cranial Nerves: No cranial nerve deficit.         Lab Results   Component Value Date     12/31/2021    K 4.0 12/31/2021     12/31/2021    CO2 27.0 12/31/2021    BUN 8 12/31/2021    CREATININE 0.91 12/31/2021    GLUCOSE 92 12/31/2021    CALCIUM 8.9 12/31/2021    AST 15 12/31/2021    ALT 8 12/31/2021    ALKPHOS 63 12/31/2021     No results found for: \"CKTOTAL\"  Lab Results   Component Value Date    WBC 5.92 12/31/2021    HGB 12.8 12/31/2021    HCT 39.0 12/31/2021     12/31/2021       ECG 12 Lead    Date/Time: 2/6/2024 2:10 PM  Performed by: Yang Oleary MD    Authorized by: Yang Oleary MD  Comparison: compared with previous ECG from 12/10/2020  Similar to previous ECG  Rhythm: sinus rhythm  Conduction: conduction normal  ST Segments: ST segments normal  T Waves: T waves normal    Clinical impression: normal ECG            Assessment & Plan    Diagnosis Plan   1. Bilateral leg edema  Duplex Venous Lower Extremity - Bilateral CAR      2. Precordial pain  Treadmill Stress Test      3. Dyspnea on exertion        4. Mild pulmonary hypertension            Recommendations  Orders Placed This Encounter   Procedures    Treadmill Stress Test    ECG 12 Lead        We will evaluate her leg edema with venous " Doppler of the lower extremities to rule out DVT.  Will add low-dose aspirin for her chest pains and evaluate further with a treadmill stress EKG test  Will change her diuretic from furosemide to bumetanide 2 mg daily for 3 days and then 1 mg daily after that.  I have asked her to cut back on salt rich foods such as pickles, all chips and sausages, hot dogs and hamburgers and cheeseburgers etc.  Check CMP and magnesium level    Return in about 3 weeks (around 2/27/2024).    As always, Geeta  I appreciate very much the opportunity to participate in the cardiovascular care of your patients. Please do not hesitate to call me with any questions with regards to Latisha Pedraza's evaluation and management.     With Best Regards,        Yang Oleary MD, [facility]    Please note that portions of this note were completed with a voice recognition program.

## 2024-02-09 ENCOUNTER — HOSPITAL ENCOUNTER (OUTPATIENT)
Facility: HOSPITAL | Age: 34
Discharge: HOME | End: 2024-02-09
Payer: COMMERCIAL

## 2024-02-09 DIAGNOSIS — R06.09 DYSPNEA ON EXERTION: ICD-10-CM

## 2024-02-09 DIAGNOSIS — R60.0 BILATERAL LEG EDEMA: ICD-10-CM

## 2024-02-09 LAB
ALBUMIN SERPL-MCNC: 5.1 G/DL (ref 3.5–5.2)
ALBUMIN/GLOB SERPL: 1.7 G/DL
ALP SERPL-CCNC: 81 U/L (ref 39–117)
ALT SERPL W P-5'-P-CCNC: 20 U/L (ref 1–33)
ANION GAP SERPL CALCULATED.3IONS-SCNC: 10.8 MMOL/L (ref 5–15)
AST SERPL-CCNC: 38 U/L (ref 1–32)
BILIRUB SERPL-MCNC: 1.6 MG/DL (ref 0–1.2)
BUN SERPL-MCNC: 22 MG/DL (ref 6–20)
BUN/CREAT SERPL: 19.6 (ref 7–25)
CALCIUM SPEC-SCNC: 9.9 MG/DL (ref 8.6–10.5)
CHLORIDE SERPL-SCNC: 97 MMOL/L (ref 98–107)
CO2 SERPL-SCNC: 32.2 MMOL/L (ref 22–29)
CREAT SERPL-MCNC: 1.12 MG/DL (ref 0.57–1)
EGFRCR SERPLBLD CKD-EPI 2021: 67.1 ML/MIN/1.73
GLOBULIN UR ELPH-MCNC: 3 GM/DL
GLUCOSE SERPL-MCNC: 92 MG/DL (ref 65–99)
MAGNESIUM SERPL-MCNC: 2.1 MG/DL (ref 1.6–2.6)
NT-PROBNP SERPL-MCNC: <36 PG/ML (ref 0–450)
POTASSIUM SERPL-SCNC: 3.9 MMOL/L (ref 3.5–5.2)
PROT SERPL-MCNC: 8.1 G/DL (ref 6–8.5)
SODIUM SERPL-SCNC: 140 MMOL/L (ref 136–145)

## 2024-02-09 PROCEDURE — 83735 ASSAY OF MAGNESIUM: CPT | Performed by: INTERNAL MEDICINE

## 2024-02-09 PROCEDURE — 80053 COMPREHEN METABOLIC PANEL: CPT | Performed by: INTERNAL MEDICINE

## 2024-02-09 PROCEDURE — 83880 ASSAY OF NATRIURETIC PEPTIDE: CPT | Performed by: INTERNAL MEDICINE

## 2024-02-27 ENCOUNTER — HOSPITAL ENCOUNTER (OUTPATIENT)
Dept: CARDIOLOGY | Facility: HOSPITAL | Age: 34
Discharge: HOME | End: 2024-02-27
Admitting: INTERNAL MEDICINE
Payer: COMMERCIAL

## 2024-02-27 DIAGNOSIS — R07.2 PRECORDIAL PAIN: ICD-10-CM

## 2024-02-27 PROCEDURE — 93017 CV STRESS TEST TRACING ONLY: CPT

## 2024-03-01 ENCOUNTER — HOSPITAL ENCOUNTER (OUTPATIENT)
Facility: HOSPITAL | Age: 34
Discharge: HOME | End: 2024-03-01
Payer: COMMERCIAL

## 2024-03-01 DIAGNOSIS — R60.0 BILATERAL LEG EDEMA: ICD-10-CM

## 2024-03-01 LAB
BH CV STRESS BP STAGE 1: NORMAL
BH CV STRESS BP STAGE 2: NORMAL
BH CV STRESS BP STAGE 3: NORMAL
BH CV STRESS BP STAGE 4: NORMAL
BH CV STRESS DURATION MIN STAGE 1: 3
BH CV STRESS DURATION MIN STAGE 2: 3
BH CV STRESS DURATION MIN STAGE 3: 3
BH CV STRESS DURATION MIN STAGE 4: 2
BH CV STRESS DURATION SEC STAGE 1: 0
BH CV STRESS DURATION SEC STAGE 2: 0
BH CV STRESS DURATION SEC STAGE 3: 0
BH CV STRESS DURATION SEC STAGE 4: 9
BH CV STRESS GRADE STAGE 1: 10
BH CV STRESS GRADE STAGE 2: 12
BH CV STRESS GRADE STAGE 3: 14
BH CV STRESS GRADE STAGE 4: 16
BH CV STRESS HR STAGE 1: 121
BH CV STRESS HR STAGE 2: 151
BH CV STRESS HR STAGE 3: 166
BH CV STRESS HR STAGE 4: 184
BH CV STRESS METS STAGE 1: 5
BH CV STRESS METS STAGE 2: 7.5
BH CV STRESS METS STAGE 3: 10
BH CV STRESS METS STAGE 4: 13.5
BH CV STRESS PROTOCOL 1: NORMAL
BH CV STRESS RECOVERY BP: NORMAL MMHG
BH CV STRESS RECOVERY HR: 84 BPM
BH CV STRESS SPEED STAGE 1: 1.7
BH CV STRESS SPEED STAGE 2: 2.5
BH CV STRESS SPEED STAGE 3: 3.4
BH CV STRESS SPEED STAGE 4: 4.2
BH CV STRESS STAGE 1: 1
BH CV STRESS STAGE 2: 2
BH CV STRESS STAGE 3: 3
BH CV STRESS STAGE 4: 4
MAXIMAL PREDICTED HEART RATE: 188 BPM
PERCENT MAX PREDICTED HR: 97.87 %
STRESS BASELINE BP: NORMAL MMHG
STRESS BASELINE HR: 87 BPM
STRESS PERCENT HR: 115 %
STRESS POST ESTIMATED WORKLOAD: 13.4 METS
STRESS POST EXERCISE DUR MIN: 11 MIN
STRESS POST EXERCISE DUR SEC: 9 SEC
STRESS POST PEAK BP: NORMAL MMHG
STRESS POST PEAK HR: 184 BPM
STRESS TARGET HR: 160 BPM

## 2024-03-01 PROCEDURE — 93970 EXTREMITY STUDY: CPT

## 2024-03-11 ENCOUNTER — TRANSCRIBE ORDERS (OUTPATIENT)
Dept: ADMINISTRATIVE | Facility: HOSPITAL | Age: 34
End: 2024-03-11
Payer: COMMERCIAL

## 2024-03-11 ENCOUNTER — LAB (OUTPATIENT)
Dept: LAB | Facility: HOSPITAL | Age: 34
End: 2024-03-11
Payer: COMMERCIAL

## 2024-03-11 ENCOUNTER — HOSPITAL ENCOUNTER (OUTPATIENT)
Dept: RESPIRATORY THERAPY | Facility: HOSPITAL | Age: 34
Discharge: HOME | End: 2024-03-11
Payer: COMMERCIAL

## 2024-03-11 ENCOUNTER — HOSPITAL ENCOUNTER (OUTPATIENT)
Dept: GENERAL RADIOLOGY | Facility: HOSPITAL | Age: 34
Discharge: HOME | End: 2024-03-11
Payer: COMMERCIAL

## 2024-03-11 DIAGNOSIS — R07.9 CHEST PAIN, UNSPECIFIED TYPE: ICD-10-CM

## 2024-03-11 DIAGNOSIS — R53.83 OTHER FATIGUE: ICD-10-CM

## 2024-03-11 DIAGNOSIS — R60.9 PERIPHERAL EDEMA: ICD-10-CM

## 2024-03-11 DIAGNOSIS — N28.9 ABNORMAL RENAL FUNCTION: ICD-10-CM

## 2024-03-11 DIAGNOSIS — N28.9 URETERAL SLUDGE: Primary | ICD-10-CM

## 2024-03-11 DIAGNOSIS — R07.9 CHEST PAIN, UNSPECIFIED TYPE: Primary | ICD-10-CM

## 2024-03-11 LAB
ALBUMIN SERPL-MCNC: 4.8 G/DL (ref 3.5–5.2)
ALBUMIN/GLOB SERPL: 1.8 G/DL
ALP SERPL-CCNC: 75 U/L (ref 39–117)
ALT SERPL W P-5'-P-CCNC: 21 U/L (ref 1–33)
AMMONIA BLD-SCNC: 15 UMOL/L (ref 11–51)
ANION GAP SERPL CALCULATED.3IONS-SCNC: 8.3 MMOL/L (ref 5–15)
AST SERPL-CCNC: 42 U/L (ref 1–32)
BASOPHILS # BLD AUTO: 0.04 10*3/MM3 (ref 0–0.2)
BASOPHILS NFR BLD AUTO: 0.6 % (ref 0–1.5)
BILIRUB SERPL-MCNC: 0.5 MG/DL (ref 0–1.2)
BUN SERPL-MCNC: 14 MG/DL (ref 6–20)
BUN/CREAT SERPL: 15.2 (ref 7–25)
CALCIUM SPEC-SCNC: 9.8 MG/DL (ref 8.6–10.5)
CHLORIDE SERPL-SCNC: 102 MMOL/L (ref 98–107)
CHOLEST SERPL-MCNC: 166 MG/DL (ref 0–200)
CO2 SERPL-SCNC: 28.7 MMOL/L (ref 22–29)
CREAT SERPL-MCNC: 0.92 MG/DL (ref 0.57–1)
DEPRECATED RDW RBC AUTO: 41.8 FL (ref 37–54)
EGFRCR SERPLBLD CKD-EPI 2021: 84.5 ML/MIN/1.73
EOSINOPHIL # BLD AUTO: 0.08 10*3/MM3 (ref 0–0.4)
EOSINOPHIL NFR BLD AUTO: 1.2 % (ref 0.3–6.2)
ERYTHROCYTE [DISTWIDTH] IN BLOOD BY AUTOMATED COUNT: 12.1 % (ref 12.3–15.4)
GLOBULIN UR ELPH-MCNC: 2.7 GM/DL
GLUCOSE SERPL-MCNC: 89 MG/DL (ref 65–99)
HBA1C MFR BLD: 5 % (ref 4.8–5.6)
HCT VFR BLD AUTO: 46.5 % (ref 34–46.6)
HDLC SERPL-MCNC: 74 MG/DL (ref 40–60)
HGB BLD-MCNC: 15.3 G/DL (ref 12–15.9)
IMM GRANULOCYTES # BLD AUTO: 0.01 10*3/MM3 (ref 0–0.05)
IMM GRANULOCYTES NFR BLD AUTO: 0.2 % (ref 0–0.5)
IRON 24H UR-MRATE: 96 MCG/DL (ref 37–145)
IRON SATN MFR SERPL: 27 % (ref 20–50)
LDLC SERPL CALC-MCNC: 78 MG/DL (ref 0–100)
LDLC/HDLC SERPL: 1.04 {RATIO}
LYMPHOCYTES # BLD AUTO: 2.22 10*3/MM3 (ref 0.7–3.1)
LYMPHOCYTES NFR BLD AUTO: 33.7 % (ref 19.6–45.3)
MAGNESIUM SERPL-MCNC: 2.4 MG/DL (ref 1.6–2.6)
MCH RBC QN AUTO: 30.6 PG (ref 26.6–33)
MCHC RBC AUTO-ENTMCNC: 32.9 G/DL (ref 31.5–35.7)
MCV RBC AUTO: 93 FL (ref 79–97)
MONOCYTES # BLD AUTO: 0.35 10*3/MM3 (ref 0.1–0.9)
MONOCYTES NFR BLD AUTO: 5.3 % (ref 5–12)
NEUTROPHILS NFR BLD AUTO: 3.89 10*3/MM3 (ref 1.7–7)
NEUTROPHILS NFR BLD AUTO: 59 % (ref 42.7–76)
NRBC BLD AUTO-RTO: 0 /100 WBC (ref 0–0.2)
NT-PROBNP SERPL-MCNC: <36 PG/ML (ref 0–450)
PHOSPHATE SERPL-MCNC: 2.5 MG/DL (ref 2.5–4.5)
PLATELET # BLD AUTO: 258 10*3/MM3 (ref 140–450)
PMV BLD AUTO: 10.3 FL (ref 6–12)
POTASSIUM SERPL-SCNC: 4.4 MMOL/L (ref 3.5–5.2)
PROT SERPL-MCNC: 7.5 G/DL (ref 6–8.5)
RBC # BLD AUTO: 5 10*6/MM3 (ref 3.77–5.28)
SODIUM SERPL-SCNC: 139 MMOL/L (ref 136–145)
TIBC SERPL-MCNC: 352 MCG/DL (ref 298–536)
TRANSFERRIN SERPL-MCNC: 236 MG/DL (ref 200–360)
TRIGL SERPL-MCNC: 77 MG/DL (ref 0–150)
TSH SERPL DL<=0.05 MIU/L-ACNC: 2.51 UIU/ML (ref 0.27–4.2)
VLDLC SERPL-MCNC: 14 MG/DL (ref 5–40)
WBC NRBC COR # BLD AUTO: 6.59 10*3/MM3 (ref 3.4–10.8)

## 2024-03-11 PROCEDURE — 84156 ASSAY OF PROTEIN URINE: CPT

## 2024-03-11 PROCEDURE — 83540 ASSAY OF IRON: CPT

## 2024-03-11 PROCEDURE — 83735 ASSAY OF MAGNESIUM: CPT

## 2024-03-11 PROCEDURE — 36415 COLL VENOUS BLD VENIPUNCTURE: CPT

## 2024-03-11 PROCEDURE — 82140 ASSAY OF AMMONIA: CPT

## 2024-03-11 PROCEDURE — 82570 ASSAY OF URINE CREATININE: CPT

## 2024-03-11 PROCEDURE — 83880 ASSAY OF NATRIURETIC PEPTIDE: CPT

## 2024-03-11 PROCEDURE — 84443 ASSAY THYROID STIM HORMONE: CPT

## 2024-03-11 PROCEDURE — 84144 ASSAY OF PROGESTERONE: CPT

## 2024-03-11 PROCEDURE — 82607 VITAMIN B-12: CPT

## 2024-03-11 PROCEDURE — 82306 VITAMIN D 25 HYDROXY: CPT

## 2024-03-11 PROCEDURE — 80061 LIPID PANEL: CPT

## 2024-03-11 PROCEDURE — 71046 X-RAY EXAM CHEST 2 VIEWS: CPT | Performed by: RADIOLOGY

## 2024-03-11 PROCEDURE — 71046 X-RAY EXAM CHEST 2 VIEWS: CPT

## 2024-03-11 PROCEDURE — 80053 COMPREHEN METABOLIC PANEL: CPT

## 2024-03-11 PROCEDURE — 84100 ASSAY OF PHOSPHORUS: CPT

## 2024-03-11 PROCEDURE — 81001 URINALYSIS AUTO W/SCOPE: CPT

## 2024-03-11 PROCEDURE — 84466 ASSAY OF TRANSFERRIN: CPT

## 2024-03-11 PROCEDURE — 83036 HEMOGLOBIN GLYCOSYLATED A1C: CPT

## 2024-03-11 PROCEDURE — 85025 COMPLETE CBC W/AUTO DIFF WBC: CPT

## 2024-03-12 LAB
25(OH)D3 SERPL-MCNC: 50.7 NG/ML (ref 30–100)
BACTERIA UR QL AUTO: ABNORMAL /HPF
BILIRUB UR QL STRIP: NEGATIVE
CLARITY UR: CLEAR
COLOR UR: YELLOW
CREAT UR-MCNC: 196.8 MG/DL
GLUCOSE UR STRIP-MCNC: NEGATIVE MG/DL
HGB UR QL STRIP.AUTO: NEGATIVE
HYALINE CASTS UR QL AUTO: ABNORMAL /LPF
KETONES UR QL STRIP: ABNORMAL
LEUKOCYTE ESTERASE UR QL STRIP.AUTO: NEGATIVE
NITRITE UR QL STRIP: NEGATIVE
PH UR STRIP.AUTO: 7 [PH] (ref 5–8)
PROGEST SERPL-MCNC: 9.59 NG/ML
PROT ?TM UR-MCNC: 12.1 MG/DL
PROT UR QL STRIP: NEGATIVE
RBC # UR STRIP: ABNORMAL /HPF
REF LAB TEST METHOD: ABNORMAL
SP GR UR STRIP: 1.03 (ref 1–1.03)
SQUAMOUS #/AREA URNS HPF: ABNORMAL /HPF
UROBILINOGEN UR QL STRIP: ABNORMAL
VIT B12 BLD-MCNC: 515 PG/ML (ref 211–946)
WBC # UR STRIP: ABNORMAL /HPF

## 2025-05-01 ENCOUNTER — HOSPITAL ENCOUNTER (INPATIENT)
Facility: HOSPITAL | Age: 35
LOS: 2 days | Discharge: HOME | End: 2025-05-03
Attending: STUDENT IN AN ORGANIZED HEALTH CARE EDUCATION/TRAINING PROGRAM | Admitting: INTERNAL MEDICINE
Payer: COMMERCIAL

## 2025-05-01 ENCOUNTER — APPOINTMENT (OUTPATIENT)
Dept: CT IMAGING | Facility: HOSPITAL | Age: 35
End: 2025-05-01
Payer: COMMERCIAL

## 2025-05-01 DIAGNOSIS — N30.01 ACUTE CYSTITIS WITH HEMATURIA: ICD-10-CM

## 2025-05-01 DIAGNOSIS — N17.9 AKI (ACUTE KIDNEY INJURY): Primary | ICD-10-CM

## 2025-05-01 LAB
ALBUMIN SERPL-MCNC: 4.5 G/DL (ref 3.5–5.2)
ALBUMIN/GLOB SERPL: 1.7 G/DL
ALP SERPL-CCNC: 60 U/L (ref 39–117)
ALT SERPL W P-5'-P-CCNC: 12 U/L (ref 1–33)
AMPHET+METHAMPHET UR QL: NEGATIVE
AMPHETAMINES UR QL: NEGATIVE
ANION GAP SERPL CALCULATED.3IONS-SCNC: 11.5 MMOL/L (ref 5–15)
ANION GAP SERPL CALCULATED.3IONS-SCNC: 9 MMOL/L (ref 5–15)
AST SERPL-CCNC: 20 U/L (ref 1–32)
BACTERIA UR QL AUTO: ABNORMAL /HPF
BARBITURATES UR QL SCN: NEGATIVE
BASOPHILS # BLD AUTO: 0.05 10*3/MM3 (ref 0–0.2)
BASOPHILS NFR BLD AUTO: 0.8 % (ref 0–1.5)
BENZODIAZ UR QL SCN: NEGATIVE
BILIRUB SERPL-MCNC: 1.8 MG/DL (ref 0–1.2)
BILIRUB UR QL STRIP: ABNORMAL
BUN SERPL-MCNC: 21 MG/DL (ref 6–20)
BUN SERPL-MCNC: 23 MG/DL (ref 6–20)
BUN/CREAT SERPL: 10.2 (ref 7–25)
BUN/CREAT SERPL: 8.9 (ref 7–25)
BUPRENORPHINE SERPL-MCNC: NEGATIVE NG/ML
CALCIUM SPEC-SCNC: 7.3 MG/DL (ref 8.6–10.5)
CALCIUM SPEC-SCNC: 9.2 MG/DL (ref 8.6–10.5)
CANNABINOIDS SERPL QL: NEGATIVE
CHLORIDE SERPL-SCNC: 108 MMOL/L (ref 98–107)
CHLORIDE SERPL-SCNC: 115 MMOL/L (ref 98–107)
CK SERPL-CCNC: 45 U/L (ref 20–180)
CLARITY UR: ABNORMAL
CO2 SERPL-SCNC: 18 MMOL/L (ref 22–29)
CO2 SERPL-SCNC: 21.5 MMOL/L (ref 22–29)
COCAINE UR QL: NEGATIVE
COLOR UR: ABNORMAL
CREAT SERPL-MCNC: 2.06 MG/DL (ref 0.57–1)
CREAT SERPL-MCNC: 2.57 MG/DL (ref 0.57–1)
D-LACTATE SERPL-SCNC: 0.9 MMOL/L (ref 0.5–2)
DEPRECATED RDW RBC AUTO: 43.1 FL (ref 37–54)
EGFRCR SERPLBLD CKD-EPI 2021: 24.5 ML/MIN/1.73
EGFRCR SERPLBLD CKD-EPI 2021: 31.9 ML/MIN/1.73
EOSINOPHIL # BLD AUTO: 0.14 10*3/MM3 (ref 0–0.4)
EOSINOPHIL NFR BLD AUTO: 2.1 % (ref 0.3–6.2)
ERYTHROCYTE [DISTWIDTH] IN BLOOD BY AUTOMATED COUNT: 12.5 % (ref 12.3–15.4)
FENTANYL UR-MCNC: NEGATIVE NG/ML
GLOBULIN UR ELPH-MCNC: 2.7 GM/DL
GLUCOSE SERPL-MCNC: 110 MG/DL (ref 65–99)
GLUCOSE SERPL-MCNC: 98 MG/DL (ref 65–99)
GLUCOSE UR STRIP-MCNC: NEGATIVE MG/DL
HCT VFR BLD AUTO: 43.3 % (ref 34–46.6)
HGB BLD-MCNC: 14 G/DL (ref 12–15.9)
HGB UR QL STRIP.AUTO: ABNORMAL
HOLD SPECIMEN: NORMAL
HOLD SPECIMEN: NORMAL
HYALINE CASTS UR QL AUTO: ABNORMAL /LPF
IMM GRANULOCYTES # BLD AUTO: 0.01 10*3/MM3 (ref 0–0.05)
IMM GRANULOCYTES NFR BLD AUTO: 0.2 % (ref 0–0.5)
KETONES UR QL STRIP: ABNORMAL
LEUKOCYTE ESTERASE UR QL STRIP.AUTO: ABNORMAL
LYMPHOCYTES # BLD AUTO: 1.24 10*3/MM3 (ref 0.7–3.1)
LYMPHOCYTES NFR BLD AUTO: 18.7 % (ref 19.6–45.3)
MCH RBC QN AUTO: 30.4 PG (ref 26.6–33)
MCHC RBC AUTO-ENTMCNC: 32.3 G/DL (ref 31.5–35.7)
MCV RBC AUTO: 93.9 FL (ref 79–97)
METHADONE UR QL SCN: NEGATIVE
MONOCYTES # BLD AUTO: 0.44 10*3/MM3 (ref 0.1–0.9)
MONOCYTES NFR BLD AUTO: 6.6 % (ref 5–12)
NEUTROPHILS NFR BLD AUTO: 4.75 10*3/MM3 (ref 1.7–7)
NEUTROPHILS NFR BLD AUTO: 71.6 % (ref 42.7–76)
NITRITE UR QL STRIP: POSITIVE
NRBC BLD AUTO-RTO: 0 /100 WBC (ref 0–0.2)
OPIATES UR QL: NEGATIVE
OXYCODONE UR QL SCN: NEGATIVE
PCP UR QL SCN: NEGATIVE
PH UR STRIP.AUTO: <=5 [PH] (ref 5–8)
PLATELET # BLD AUTO: 161 10*3/MM3 (ref 140–450)
PMV BLD AUTO: 11.1 FL (ref 6–12)
POTASSIUM SERPL-SCNC: 4 MMOL/L (ref 3.5–5.2)
POTASSIUM SERPL-SCNC: 4.1 MMOL/L (ref 3.5–5.2)
PROT SERPL-MCNC: 7.2 G/DL (ref 6–8.5)
PROT UR QL STRIP: ABNORMAL
RBC # BLD AUTO: 4.61 10*6/MM3 (ref 3.77–5.28)
RBC # UR STRIP: ABNORMAL /HPF
REF LAB TEST METHOD: ABNORMAL
SODIUM SERPL-SCNC: 141 MMOL/L (ref 136–145)
SODIUM SERPL-SCNC: 142 MMOL/L (ref 136–145)
SODIUM UR-SCNC: 49 MMOL/L
SP GR UR STRIP: 1.02 (ref 1–1.03)
SQUAMOUS #/AREA URNS HPF: ABNORMAL /HPF
TRICYCLICS UR QL SCN: NEGATIVE
UROBILINOGEN UR QL STRIP: ABNORMAL
WBC # UR STRIP: ABNORMAL /HPF
WBC NRBC COR # BLD AUTO: 6.63 10*3/MM3 (ref 3.4–10.8)
WHOLE BLOOD HOLD COAG: NORMAL
WHOLE BLOOD HOLD SPECIMEN: NORMAL

## 2025-05-01 PROCEDURE — 84300 ASSAY OF URINE SODIUM: CPT | Performed by: INTERNAL MEDICINE

## 2025-05-01 PROCEDURE — 74176 CT ABD & PELVIS W/O CONTRAST: CPT | Performed by: RADIOLOGY

## 2025-05-01 PROCEDURE — 25010000002 KETOROLAC TROMETHAMINE PER 15 MG: Performed by: PHYSICIAN ASSISTANT

## 2025-05-01 PROCEDURE — 25010000002 MORPHINE PER 10 MG

## 2025-05-01 PROCEDURE — 80307 DRUG TEST PRSMV CHEM ANLYZR: CPT | Performed by: INTERNAL MEDICINE

## 2025-05-01 PROCEDURE — 99285 EMERGENCY DEPT VISIT HI MDM: CPT

## 2025-05-01 PROCEDURE — 82570 ASSAY OF URINE CREATININE: CPT | Performed by: INTERNAL MEDICINE

## 2025-05-01 PROCEDURE — 25810000003 SODIUM CHLORIDE 0.9 % SOLUTION: Performed by: PHYSICIAN ASSISTANT

## 2025-05-01 PROCEDURE — 36415 COLL VENOUS BLD VENIPUNCTURE: CPT

## 2025-05-01 PROCEDURE — 96365 THER/PROPH/DIAG IV INF INIT: CPT

## 2025-05-01 PROCEDURE — 74176 CT ABD & PELVIS W/O CONTRAST: CPT

## 2025-05-01 PROCEDURE — 96375 TX/PRO/DX INJ NEW DRUG ADDON: CPT

## 2025-05-01 PROCEDURE — 85025 COMPLETE CBC W/AUTO DIFF WBC: CPT | Performed by: PHYSICIAN ASSISTANT

## 2025-05-01 PROCEDURE — 81001 URINALYSIS AUTO W/SCOPE: CPT | Performed by: PHYSICIAN ASSISTANT

## 2025-05-01 PROCEDURE — 25010000002 ONDANSETRON PER 1 MG: Performed by: PHYSICIAN ASSISTANT

## 2025-05-01 PROCEDURE — 87086 URINE CULTURE/COLONY COUNT: CPT | Performed by: PHYSICIAN ASSISTANT

## 2025-05-01 PROCEDURE — 99223 1ST HOSP IP/OBS HIGH 75: CPT | Performed by: INTERNAL MEDICINE

## 2025-05-01 PROCEDURE — 83605 ASSAY OF LACTIC ACID: CPT | Performed by: PHYSICIAN ASSISTANT

## 2025-05-01 PROCEDURE — 80053 COMPREHEN METABOLIC PANEL: CPT | Performed by: PHYSICIAN ASSISTANT

## 2025-05-01 PROCEDURE — 25010000002 PROCHLORPERAZINE 10 MG/2ML SOLUTION: Performed by: PHYSICIAN ASSISTANT

## 2025-05-01 PROCEDURE — 25010000002 CEFTRIAXONE PER 250 MG: Performed by: PHYSICIAN ASSISTANT

## 2025-05-01 PROCEDURE — 82550 ASSAY OF CK (CPK): CPT | Performed by: INTERNAL MEDICINE

## 2025-05-01 PROCEDURE — 93005 ELECTROCARDIOGRAM TRACING: CPT | Performed by: INTERNAL MEDICINE

## 2025-05-01 RX ORDER — PROCHLORPERAZINE EDISYLATE 5 MG/ML
5 INJECTION INTRAMUSCULAR; INTRAVENOUS EVERY 6 HOURS PRN
Status: DISCONTINUED | OUTPATIENT
Start: 2025-05-01 | End: 2025-05-03 | Stop reason: HOSPADM

## 2025-05-01 RX ORDER — BISACODYL 5 MG/1
5 TABLET, DELAYED RELEASE ORAL DAILY PRN
Status: DISCONTINUED | OUTPATIENT
Start: 2025-05-01 | End: 2025-05-03 | Stop reason: HOSPADM

## 2025-05-01 RX ORDER — BISACODYL 10 MG
10 SUPPOSITORY, RECTAL RECTAL DAILY PRN
Status: DISCONTINUED | OUTPATIENT
Start: 2025-05-01 | End: 2025-05-03 | Stop reason: HOSPADM

## 2025-05-01 RX ORDER — LIDOCAINE 4 G/G
1 PATCH TOPICAL DAILY PRN
Status: DISCONTINUED | OUTPATIENT
Start: 2025-05-01 | End: 2025-05-03 | Stop reason: HOSPADM

## 2025-05-01 RX ORDER — POTASSIUM CHLORIDE 1500 MG/1
10 TABLET, EXTENDED RELEASE ORAL DAILY PRN
Status: CANCELLED | OUTPATIENT
Start: 2025-05-01

## 2025-05-01 RX ORDER — TRAZODONE HYDROCHLORIDE 50 MG/1
100 TABLET ORAL NIGHTLY
Status: DISCONTINUED | OUTPATIENT
Start: 2025-05-02 | End: 2025-05-02

## 2025-05-01 RX ORDER — HYDROCODONE BITARTRATE AND ACETAMINOPHEN 7.5; 325 MG/1; MG/1
1 TABLET ORAL EVERY 6 HOURS PRN
Status: DISCONTINUED | OUTPATIENT
Start: 2025-05-01 | End: 2025-05-03 | Stop reason: HOSPADM

## 2025-05-01 RX ORDER — FLUCONAZOLE 150 MG/1
150 TABLET ORAL AS NEEDED
COMMUNITY

## 2025-05-01 RX ORDER — SODIUM CHLORIDE 9 MG/ML
40 INJECTION, SOLUTION INTRAVENOUS AS NEEDED
Status: DISCONTINUED | OUTPATIENT
Start: 2025-05-01 | End: 2025-05-03 | Stop reason: HOSPADM

## 2025-05-01 RX ORDER — IBUPROFEN 800 MG/1
800 TABLET, FILM COATED ORAL 2 TIMES DAILY
COMMUNITY
End: 2025-05-03 | Stop reason: HOSPADM

## 2025-05-01 RX ORDER — TOPIRAMATE 25 MG/1
50 TABLET, FILM COATED ORAL DAILY
Status: DISCONTINUED | OUTPATIENT
Start: 2025-05-02 | End: 2025-05-03 | Stop reason: HOSPADM

## 2025-05-01 RX ORDER — PROCHLORPERAZINE EDISYLATE 5 MG/ML
10 INJECTION INTRAMUSCULAR; INTRAVENOUS ONCE
Status: COMPLETED | OUTPATIENT
Start: 2025-05-01 | End: 2025-05-01

## 2025-05-01 RX ORDER — ONDANSETRON 4 MG/1
8 TABLET, ORALLY DISINTEGRATING ORAL EVERY 6 HOURS PRN
Status: DISCONTINUED | OUTPATIENT
Start: 2025-05-01 | End: 2025-05-03 | Stop reason: HOSPADM

## 2025-05-01 RX ORDER — PROMETHAZINE HYDROCHLORIDE 25 MG/1
25 TABLET ORAL EVERY 6 HOURS PRN
COMMUNITY

## 2025-05-01 RX ORDER — FUROSEMIDE 20 MG/1
20 TABLET ORAL DAILY PRN
Status: CANCELLED | OUTPATIENT
Start: 2025-05-01

## 2025-05-01 RX ORDER — AMOXICILLIN 250 MG
2 CAPSULE ORAL 2 TIMES DAILY PRN
Status: DISCONTINUED | OUTPATIENT
Start: 2025-05-01 | End: 2025-05-03 | Stop reason: HOSPADM

## 2025-05-01 RX ORDER — CEFDINIR 300 MG/1
300 CAPSULE ORAL 2 TIMES DAILY
Qty: 14 CAPSULE | Refills: 0 | Status: SHIPPED | OUTPATIENT
Start: 2025-05-01 | End: 2025-05-03

## 2025-05-01 RX ORDER — IBUPROFEN 400 MG/1
800 TABLET, FILM COATED ORAL 2 TIMES DAILY
Status: CANCELLED | OUTPATIENT
Start: 2025-05-01

## 2025-05-01 RX ORDER — METHOCARBAMOL 500 MG/1
500 TABLET, FILM COATED ORAL EVERY 8 HOURS PRN
Status: DISCONTINUED | OUTPATIENT
Start: 2025-05-01 | End: 2025-05-01

## 2025-05-01 RX ORDER — LIDOCAINE 50 MG/G
1 PATCH TOPICAL DAILY PRN
COMMUNITY

## 2025-05-01 RX ORDER — ACETAMINOPHEN 500 MG
1000 TABLET ORAL EVERY 8 HOURS PRN
Status: DISCONTINUED | OUTPATIENT
Start: 2025-05-01 | End: 2025-05-03 | Stop reason: HOSPADM

## 2025-05-01 RX ORDER — SODIUM CHLORIDE 0.9 % (FLUSH) 0.9 %
10 SYRINGE (ML) INJECTION EVERY 12 HOURS SCHEDULED
Status: DISCONTINUED | OUTPATIENT
Start: 2025-05-01 | End: 2025-05-03 | Stop reason: HOSPADM

## 2025-05-01 RX ORDER — KETOROLAC TROMETHAMINE 30 MG/ML
30 INJECTION, SOLUTION INTRAMUSCULAR; INTRAVENOUS ONCE
Status: COMPLETED | OUTPATIENT
Start: 2025-05-01 | End: 2025-05-01

## 2025-05-01 RX ORDER — SODIUM CHLORIDE 0.9 % (FLUSH) 0.9 %
10 SYRINGE (ML) INJECTION AS NEEDED
Status: DISCONTINUED | OUTPATIENT
Start: 2025-05-01 | End: 2025-05-03 | Stop reason: HOSPADM

## 2025-05-01 RX ORDER — ESCITALOPRAM OXALATE 10 MG/1
40 TABLET ORAL DAILY
Status: DISCONTINUED | OUTPATIENT
Start: 2025-05-02 | End: 2025-05-03 | Stop reason: HOSPADM

## 2025-05-01 RX ORDER — NITROGLYCERIN 0.4 MG/1
0.4 TABLET SUBLINGUAL
Status: DISCONTINUED | OUTPATIENT
Start: 2025-05-01 | End: 2025-05-03 | Stop reason: HOSPADM

## 2025-05-01 RX ORDER — TOPIRAMATE 50 MG/1
50 TABLET, FILM COATED ORAL DAILY
COMMUNITY

## 2025-05-01 RX ORDER — MORPHINE SULFATE 2 MG/ML
2 INJECTION, SOLUTION INTRAMUSCULAR; INTRAVENOUS ONCE
Status: COMPLETED | OUTPATIENT
Start: 2025-05-01 | End: 2025-05-01

## 2025-05-01 RX ORDER — ONDANSETRON 2 MG/ML
4 INJECTION INTRAMUSCULAR; INTRAVENOUS ONCE
Status: COMPLETED | OUTPATIENT
Start: 2025-05-01 | End: 2025-05-01

## 2025-05-01 RX ORDER — POTASSIUM CHLORIDE 750 MG/1
10 CAPSULE, EXTENDED RELEASE ORAL DAILY PRN
COMMUNITY
End: 2025-05-03 | Stop reason: HOSPADM

## 2025-05-01 RX ORDER — ESCITALOPRAM OXALATE 20 MG/1
40 TABLET ORAL DAILY
COMMUNITY

## 2025-05-01 RX ORDER — POLYETHYLENE GLYCOL 3350 17 G/17G
17 POWDER, FOR SOLUTION ORAL DAILY PRN
Status: DISCONTINUED | OUTPATIENT
Start: 2025-05-01 | End: 2025-05-03 | Stop reason: HOSPADM

## 2025-05-01 RX ORDER — PROMETHAZINE HYDROCHLORIDE 25 MG/1
25 TABLET ORAL EVERY 6 HOURS PRN
Status: DISCONTINUED | OUTPATIENT
Start: 2025-05-01 | End: 2025-05-03 | Stop reason: HOSPADM

## 2025-05-01 RX ORDER — ONDANSETRON 8 MG/1
8 TABLET, ORALLY DISINTEGRATING ORAL EVERY 6 HOURS PRN
COMMUNITY

## 2025-05-01 RX ORDER — FUROSEMIDE 20 MG/1
20 TABLET ORAL DAILY PRN
COMMUNITY

## 2025-05-01 RX ORDER — TRAZODONE HYDROCHLORIDE 100 MG/1
100 TABLET ORAL NIGHTLY
COMMUNITY

## 2025-05-01 RX ADMIN — CEFTRIAXONE 1000 MG: 1 INJECTION, POWDER, FOR SOLUTION INTRAMUSCULAR; INTRAVENOUS at 17:35

## 2025-05-01 RX ADMIN — SODIUM CHLORIDE 1000 ML: 9 INJECTION, SOLUTION INTRAVENOUS at 16:20

## 2025-05-01 RX ADMIN — SODIUM CHLORIDE 1000 ML: 9 INJECTION, SOLUTION INTRAVENOUS at 18:10

## 2025-05-01 RX ADMIN — PROCHLORPERAZINE EDISYLATE 10 MG: 5 INJECTION INTRAMUSCULAR; INTRAVENOUS at 20:03

## 2025-05-01 RX ADMIN — MORPHINE SULFATE 2 MG: 2 INJECTION, SOLUTION INTRAMUSCULAR; INTRAVENOUS at 21:24

## 2025-05-01 RX ADMIN — ONDANSETRON 4 MG: 2 INJECTION INTRAMUSCULAR; INTRAVENOUS at 16:20

## 2025-05-01 RX ADMIN — SODIUM CHLORIDE 1000 ML: 9 INJECTION, SOLUTION INTRAVENOUS at 19:24

## 2025-05-01 RX ADMIN — KETOROLAC TROMETHAMINE 30 MG: 30 INJECTION, SOLUTION INTRAMUSCULAR; INTRAVENOUS at 16:20

## 2025-05-01 NOTE — ED PROVIDER NOTES
Subjective   History of Present Illness  Patient reports that she has had bilateral flank pain with right side worst with nausea since Tuesday.    History provided by:  Patient   used: No    Dysuria  Pain quality:  Aching  Pain severity:  Moderate  Onset quality:  Sudden  Duration:  3 days  Timing:  Constant  Progression:  Worsening  Chronicity:  Recurrent  Recent urinary tract infections: yes    Relieved by:  None tried  Ineffective treatments:  None tried      Review of Systems   Genitourinary:  Positive for dysuria.       Past Medical History:   Diagnosis Date    Anxiety     Back pain     Constipation     Depression     Endometriosis     Heart murmur     Heart murmur     Infection, Shigella 2005    Migraines     Ovarian cyst     Pain     PELVIC    PCOS (polycystic ovarian syndrome)     Pelvic pain     Wrist fracture        No Known Allergies    Past Surgical History:   Procedure Laterality Date    ABDOMINAL SURGERY       SECTION      CYSTOSCOPY BLADDER HYDRODISTENSION N/A 10/21/2020    Procedure: CYSTOSCOPY BLADDER HYDRODISTENSION;  Surgeon: Reji Baum MD;  Location: [facility];  Service: Urology;  Laterality: N/A;    DIAGNOSTIC LAPAROSCOPY      DIAGNOSTIC LAPAROSCOPY N/A 2016    Procedure: DIAGNOSTIC LAPAROSCOPY IUD REMOVAL, FULGERATION OF ENDOMETRIOSIS;  Surgeon: Chapin Russell DO;  Location: [facility];  Service:     DIAGNOSTIC LAPAROSCOPY N/A 10/9/2019    Procedure: LAPAROSCOPY lysis of adhesions;  Surgeon: Chapin Russell DO;  Location: [facility] OR;  Service: Obstetrics/Gynecology       Family History   Problem Relation Age of Onset    No Known Problems Father     No Known Problems Mother     Diabetes Maternal Grandmother     Kidney disease Maternal Grandfather     Heart disease Maternal Grandfather        Social History     Socioeconomic History    Marital status:    Tobacco Use    Smoking status: Never    Smokeless tobacco: Never   Vaping  Use    Vaping status: Never Used   Substance and Sexual Activity    Alcohol use: No    Drug use: No    Sexual activity: Yes     Partners: Male     Birth control/protection: I.U.D.           Objective   Physical Exam  Vitals and nursing note reviewed.   Constitutional:       Appearance: She is well-developed.   HENT:      Head: Normocephalic.   Cardiovascular:      Rate and Rhythm: Normal rate and regular rhythm.   Pulmonary:      Effort: Pulmonary effort is normal.      Breath sounds: Normal breath sounds.   Abdominal:      General: Bowel sounds are normal.      Palpations: Abdomen is soft.      Tenderness: There is no abdominal tenderness.   Musculoskeletal:         General: Normal range of motion.      Cervical back: Neck supple.   Skin:     General: Skin is warm and dry.   Neurological:      Mental Status: She is alert and oriented to person, place, and time.   Psychiatric:         Behavior: Behavior normal.         Thought Content: Thought content normal.         Judgment: Judgment normal.       Results for orders placed or performed during the hospital encounter of 05/01/25   Comprehensive Metabolic Panel    Collection Time: 05/01/25  4:15 PM    Specimen: Blood   Result Value Ref Range    Glucose 98 65 - 99 mg/dL    BUN 23 (H) 6 - 20 mg/dL    Creatinine 2.57 (H) 0.57 - 1.00 mg/dL    Sodium 141 136 - 145 mmol/L    Potassium 4.1 3.5 - 5.2 mmol/L    Chloride 108 (H) 98 - 107 mmol/L    CO2 21.5 (L) 22.0 - 29.0 mmol/L    Calcium 9.2 8.6 - 10.5 mg/dL    Total Protein 7.2 6.0 - 8.5 g/dL    Albumin 4.5 3.5 - 5.2 g/dL    ALT (SGPT) 12 1 - 33 U/L    AST (SGOT) 20 1 - 32 U/L    Alkaline Phosphatase 60 39 - 117 U/L    Total Bilirubin 1.8 (H) 0.0 - 1.2 mg/dL    Globulin 2.7 gm/dL    A/G Ratio 1.7 g/dL    BUN/Creatinine Ratio 8.9 7.0 - 25.0    Anion Gap 11.5 5.0 - 15.0 mmol/L    eGFR 24.5 (L) >60.0 mL/min/1.73   Urinalysis With Culture If Indicated - Urine, Clean Catch    Collection Time: 05/01/25  4:15 PM    Specimen:  Urine, Clean Catch   Result Value Ref Range    Color, UA Orange (A) Yellow, Straw    Appearance, UA Cloudy (A) Clear    pH, UA <=5.0 5.0 - 8.0    Specific Gravity, UA 1.016 1.005 - 1.030    Glucose, UA Negative Negative    Ketones, UA Trace (A) Negative    Bilirubin, UA Small (1+) (A) Negative    Blood, UA Large (3+) (A) Negative    Protein, UA 30 mg/dL (1+) (A) Negative    Leuk Esterase, UA Moderate (2+) (A) Negative    Nitrite, UA Positive (A) Negative    Urobilinogen, UA 1.0 E.U./dL 0.2 - 1.0 E.U./dL   CBC Auto Differential    Collection Time: 05/01/25  4:15 PM    Specimen: Blood   Result Value Ref Range    WBC 6.63 3.40 - 10.80 10*3/mm3    RBC 4.61 3.77 - 5.28 10*6/mm3    Hemoglobin 14.0 12.0 - 15.9 g/dL    Hematocrit 43.3 34.0 - 46.6 %    MCV 93.9 79.0 - 97.0 fL    MCH 30.4 26.6 - 33.0 pg    MCHC 32.3 31.5 - 35.7 g/dL    RDW 12.5 12.3 - 15.4 %    RDW-SD 43.1 37.0 - 54.0 fl    MPV 11.1 6.0 - 12.0 fL    Platelets 161 140 - 450 10*3/mm3    Neutrophil % 71.6 42.7 - 76.0 %    Lymphocyte % 18.7 (L) 19.6 - 45.3 %    Monocyte % 6.6 5.0 - 12.0 %    Eosinophil % 2.1 0.3 - 6.2 %    Basophil % 0.8 0.0 - 1.5 %    Immature Grans % 0.2 0.0 - 0.5 %    Neutrophils, Absolute 4.75 1.70 - 7.00 10*3/mm3    Lymphocytes, Absolute 1.24 0.70 - 3.10 10*3/mm3    Monocytes, Absolute 0.44 0.10 - 0.90 10*3/mm3    Eosinophils, Absolute 0.14 0.00 - 0.40 10*3/mm3    Basophils, Absolute 0.05 0.00 - 0.20 10*3/mm3    Immature Grans, Absolute 0.01 0.00 - 0.05 10*3/mm3    nRBC 0.0 0.0 - 0.2 /100 WBC   Urinalysis, Microscopic Only - Urine, Clean Catch    Collection Time: 05/01/25  4:15 PM    Specimen: Urine, Clean Catch   Result Value Ref Range    RBC, UA Too Numerous to Count (A) None Seen, 0-2 /HPF    WBC, UA 21-50 (A) None Seen, 0-2 /HPF    Bacteria, UA None Seen None Seen /HPF    Squamous Epithelial Cells, UA 7-12 (A) None Seen, 0-2 /HPF    Hyaline Casts, UA 3-6 None Seen /LPF    Methodology Automated Microscopy    Green Top (Gel)    Collection  Time: 05/01/25  4:15 PM   Result Value Ref Range    Extra Tube Hold for add-ons.    Lavender Top    Collection Time: 05/01/25  4:15 PM   Result Value Ref Range    Extra Tube hold for add-on    Gold Top - SST    Collection Time: 05/01/25  4:15 PM   Result Value Ref Range    Extra Tube Hold for add-ons.    Light Blue Top    Collection Time: 05/01/25  4:15 PM   Result Value Ref Range    Extra Tube Hold for add-ons.    Lactic Acid, Plasma    Collection Time: 05/01/25  5:00 PM    Specimen: Blood   Result Value Ref Range    Lactate 0.9 0.5 - 2.0 mmol/L   Basic Metabolic Panel    Collection Time: 05/01/25  8:23 PM    Specimen: Blood   Result Value Ref Range    Glucose 110 (H) 65 - 99 mg/dL    BUN 21 (H) 6 - 20 mg/dL    Creatinine 2.06 (H) 0.57 - 1.00 mg/dL    Sodium 142 136 - 145 mmol/L    Potassium 4.0 3.5 - 5.2 mmol/L    Chloride 115 (H) 98 - 107 mmol/L    CO2 18.0 (L) 22.0 - 29.0 mmol/L    Calcium 7.3 (L) 8.6 - 10.5 mg/dL    BUN/Creatinine Ratio 10.2 7.0 - 25.0    Anion Gap 9.0 5.0 - 15.0 mmol/L    eGFR 31.9 (L) >60.0 mL/min/1.73     CT Abdomen Pelvis Stone Protocol  Result Date: 5/1/2025   No acute process in the abdomen/pelvis.  This report was finalized on 5/1/2025 5:35 PM by Jenna Sneed MD.          Procedures           ED Course     ED Course as of 05/01/25 2123   Thu May 01, 2025   2118 Spoke with Dr. Abad who accepts the patient to the hospitalist team. [KH]      ED Course User Index  [KH] Yaneth Marin, APRN                                                       Medical Decision Making  Problems Addressed:  Acute cystitis with hematuria: complicated acute illness or injury  HUBER (acute kidney injury): complicated acute illness or injury    Amount and/or Complexity of Data Reviewed  Labs: ordered.  Radiology: ordered.    Risk  OTC drugs.  Prescription drug management.  Decision regarding hospitalization.        Final diagnoses:   HUBER (acute kidney injury)   Acute cystitis with hematuria       ED  Disposition  ED Disposition       ED Disposition   Decision to Admit    Condition   --    Comment   Level of Care: Medical Telemetry [23]   Diagnosis: HUBER (acute kidney injury) [240700]   Admitting Physician: AMY NAGY [[id number]]   Certification: I Certify That Inpatient Hospital Services Are Medically Necessary For Greater Than 2 Midnights                 Andrea Scruggs, APRN  [address] [id number] [phone]    In 2 days           Medication List        New Prescriptions      cefdinir 300 MG capsule  Commonly known as: OMNICEF  Take 1 capsule by mouth 2 (Two) Times a Day for 7 days.            Stop      promethazine 25 MG tablet  Commonly known as: PHENERGAN               Where to Get Your Medications        These medications were sent to [facility] - [place], [place] - [address] [place] [address] [phone]  - [phone] [address] [address]      Phone: [phone]   cefdinir 300 MG capsule            Yaneth Marin, APRN  05/01/25 1247

## 2025-05-02 LAB
ALBUMIN SERPL-MCNC: 2.8 G/DL (ref 3.5–5.2)
ALBUMIN/GLOB SERPL: 1.5 G/DL
ALP SERPL-CCNC: 43 U/L (ref 39–117)
ALT SERPL W P-5'-P-CCNC: 9 U/L (ref 1–33)
ANION GAP SERPL CALCULATED.3IONS-SCNC: 7.4 MMOL/L (ref 5–15)
ANION GAP SERPL CALCULATED.3IONS-SCNC: 8.8 MMOL/L (ref 5–15)
AST SERPL-CCNC: 15 U/L (ref 1–32)
BASOPHILS # BLD AUTO: 0.04 10*3/MM3 (ref 0–0.2)
BASOPHILS NFR BLD AUTO: 0.7 % (ref 0–1.5)
BILIRUB SERPL-MCNC: 0.6 MG/DL (ref 0–1.2)
BUN SERPL-MCNC: 15 MG/DL (ref 6–20)
BUN SERPL-MCNC: 20 MG/DL (ref 6–20)
BUN/CREAT SERPL: 10.4 (ref 7–25)
BUN/CREAT SERPL: 9.8 (ref 7–25)
CALCIUM SPEC-SCNC: 7.2 MG/DL (ref 8.6–10.5)
CALCIUM SPEC-SCNC: 7.7 MG/DL (ref 8.6–10.5)
CHLORIDE SERPL-SCNC: 113 MMOL/L (ref 98–107)
CHLORIDE SERPL-SCNC: 115 MMOL/L (ref 98–107)
CO2 SERPL-SCNC: 19.2 MMOL/L (ref 22–29)
CO2 SERPL-SCNC: 19.6 MMOL/L (ref 22–29)
CREAT SERPL-MCNC: 1.53 MG/DL (ref 0.57–1)
CREAT SERPL-MCNC: 1.92 MG/DL (ref 0.57–1)
CREAT UR-MCNC: 194.5 MG/DL
DEPRECATED RDW RBC AUTO: 44 FL (ref 37–54)
EGFRCR SERPLBLD CKD-EPI 2021: 34.7 ML/MIN/1.73
EGFRCR SERPLBLD CKD-EPI 2021: 45.6 ML/MIN/1.73
EOSINOPHIL # BLD AUTO: 0.27 10*3/MM3 (ref 0–0.4)
EOSINOPHIL NFR BLD AUTO: 4.9 % (ref 0.3–6.2)
ERYTHROCYTE [DISTWIDTH] IN BLOOD BY AUTOMATED COUNT: 12.6 % (ref 12.3–15.4)
GLOBULIN UR ELPH-MCNC: 1.9 GM/DL
GLUCOSE SERPL-MCNC: 100 MG/DL (ref 65–99)
GLUCOSE SERPL-MCNC: 97 MG/DL (ref 65–99)
HCT VFR BLD AUTO: 33.7 % (ref 34–46.6)
HGB BLD-MCNC: 10.6 G/DL (ref 12–15.9)
IMM GRANULOCYTES # BLD AUTO: 0.01 10*3/MM3 (ref 0–0.05)
IMM GRANULOCYTES NFR BLD AUTO: 0.2 % (ref 0–0.5)
LYMPHOCYTES # BLD AUTO: 1.56 10*3/MM3 (ref 0.7–3.1)
LYMPHOCYTES NFR BLD AUTO: 28.5 % (ref 19.6–45.3)
MCH RBC QN AUTO: 30.2 PG (ref 26.6–33)
MCHC RBC AUTO-ENTMCNC: 31.5 G/DL (ref 31.5–35.7)
MCV RBC AUTO: 96 FL (ref 79–97)
MONOCYTES # BLD AUTO: 0.43 10*3/MM3 (ref 0.1–0.9)
MONOCYTES NFR BLD AUTO: 7.8 % (ref 5–12)
NEUTROPHILS NFR BLD AUTO: 3.17 10*3/MM3 (ref 1.7–7)
NEUTROPHILS NFR BLD AUTO: 57.9 % (ref 42.7–76)
NRBC BLD AUTO-RTO: 0 /100 WBC (ref 0–0.2)
PLATELET # BLD AUTO: 123 10*3/MM3 (ref 140–450)
PMV BLD AUTO: 11.6 FL (ref 6–12)
POTASSIUM SERPL-SCNC: 4.1 MMOL/L (ref 3.5–5.2)
POTASSIUM SERPL-SCNC: 4.4 MMOL/L (ref 3.5–5.2)
PROT SERPL-MCNC: 4.7 G/DL (ref 6–8.5)
QT INTERVAL: 362 MS
QTC INTERVAL: 457 MS
RBC # BLD AUTO: 3.51 10*6/MM3 (ref 3.77–5.28)
SODIUM SERPL-SCNC: 141 MMOL/L (ref 136–145)
SODIUM SERPL-SCNC: 142 MMOL/L (ref 136–145)
WBC NRBC COR # BLD AUTO: 5.48 10*3/MM3 (ref 3.4–10.8)

## 2025-05-02 PROCEDURE — 80053 COMPREHEN METABOLIC PANEL: CPT | Performed by: INTERNAL MEDICINE

## 2025-05-02 PROCEDURE — 96366 THER/PROPH/DIAG IV INF ADDON: CPT

## 2025-05-02 PROCEDURE — 85025 COMPLETE CBC W/AUTO DIFF WBC: CPT | Performed by: INTERNAL MEDICINE

## 2025-05-02 PROCEDURE — 99232 SBSQ HOSP IP/OBS MODERATE 35: CPT | Performed by: INTERNAL MEDICINE

## 2025-05-02 PROCEDURE — 96367 TX/PROPH/DG ADDL SEQ IV INF: CPT

## 2025-05-02 PROCEDURE — 25010000002 CEFTRIAXONE PER 250 MG: Performed by: INTERNAL MEDICINE

## 2025-05-02 RX ORDER — TIZANIDINE 2 MG/1
2 TABLET ORAL DAILY PRN
COMMUNITY

## 2025-05-02 RX ORDER — TRAZODONE HYDROCHLORIDE 50 MG/1
50 TABLET ORAL NIGHTLY
Status: DISCONTINUED | OUTPATIENT
Start: 2025-05-02 | End: 2025-05-03 | Stop reason: HOSPADM

## 2025-05-02 RX ORDER — TRAZODONE HYDROCHLORIDE 50 MG/1
50 TABLET ORAL NIGHTLY
Status: DISCONTINUED | OUTPATIENT
Start: 2025-05-02 | End: 2025-05-02

## 2025-05-02 RX ADMIN — TRAZODONE HYDROCHLORIDE 50 MG: 50 TABLET ORAL at 00:57

## 2025-05-02 RX ADMIN — TOPIRAMATE 50 MG: 25 TABLET, FILM COATED ORAL at 08:34

## 2025-05-02 RX ADMIN — SODIUM CHLORIDE 75 MEQ: 450 INJECTION, SOLUTION INTRAVENOUS at 08:34

## 2025-05-02 RX ADMIN — CEFTRIAXONE 1000 MG: 1 INJECTION, POWDER, FOR SOLUTION INTRAMUSCULAR; INTRAVENOUS at 17:22

## 2025-05-02 RX ADMIN — SODIUM CHLORIDE 75 MEQ: 450 INJECTION, SOLUTION INTRAVENOUS at 17:22

## 2025-05-02 RX ADMIN — Medication 10 ML: at 22:01

## 2025-05-02 RX ADMIN — ESCITALOPRAM OXALATE 40 MG: 10 TABLET ORAL at 08:34

## 2025-05-02 RX ADMIN — TRAZODONE HYDROCHLORIDE 50 MG: 50 TABLET ORAL at 22:00

## 2025-05-02 RX ADMIN — Medication 10 ML: at 00:07

## 2025-05-02 RX ADMIN — SODIUM CHLORIDE 75 MEQ: 450 INJECTION, SOLUTION INTRAVENOUS at 00:07

## 2025-05-02 NOTE — H&P
"[facility]   HISTORY AND PHYSICAL    Patient Name: Latisha Mccain  : 1990  MRN: [id number]  Primary Care Physician:  Andrea Scruggs, ALEX  Date of admission: 2025    Subjective   Subjective     Chief Complaint: Bilateral flank pain    History of Present Illness the patient is a 35-year-old female with past medical history significant for IBS/C, endometriosis, recurrent cystitis/urinary tract infections and interstitial cystitis who presents to the emergency department complaining of bilateral flank pain.    Patient seen and examined in . Mother is present at bedside.  Patient states that symptoms started on Tuesday.  She reported dysuria and initially anterior abdominal pain which is now bilateral, right greater than left flank pain.  She reports severe nausea without vomiting.  No stone sediment noted.  She states that her urine is typically adrian in color, however, the patient's mother states that it now is \"the color of the brodie.\"  She denies any urinary frequency and states that on Tuesday she did not have great urine output but since then her urine output appears normal.    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  She states that she took this medication on Tuesday and Wednesday and has had 1 dose of Pyridium.  She denies any NSAIDs and states that she has only been taking Tylenol as needed.  She has had a subjective fever but no recorded fever, however, this could be masked by Tylenol.    Patient denies any Bactrim use.  She states that she does have a prescription for Bumex but she only takes this as needed and has not had this medication any at all this week.  She does report poor oral intake.  She denies any diarrhea, shortness of air and/or cough.    While in the emergency department, patient received 1 g ceftriaxone, 30 mg IV ketorolac, 2 mg IV morphine, 4 mg IV Zofran and 10 mg IV Compazine with a total of 3 L " "NS    Review of Systems   Constitutional:  Positive for fever (\"felt warm\" no fever but had taken tylenol). Negative for chills and diaphoresis.   HENT:  Negative for hearing loss and trouble swallowing.    Eyes:  Negative for discharge and visual disturbance.   Respiratory:  Negative for cough, shortness of breath and wheezing.    Cardiovascular:  Negative for chest pain, palpitations and leg swelling.   Gastrointestinal:  Positive for abdominal pain and nausea. Negative for constipation, diarrhea and vomiting.   Endocrine: Negative for polydipsia, polyphagia and polyuria.   Genitourinary:  Positive for dysuria and flank pain (B/L, R>L). Negative for decreased urine volume, difficulty urinating, frequency and hematuria.   Musculoskeletal:  Positive for back pain (See above). Negative for gait problem, myalgias and neck pain.   Skin:  Negative for rash.   Neurological:  Negative for dizziness, tremors, seizures, syncope, weakness and light-headedness.   Hematological:  Does not bruise/bleed easily.   Psychiatric/Behavioral:  Negative for agitation and confusion.         Personal History     Past Medical History:   Diagnosis Date    Anxiety     Back pain     Constipation     Depression     Endometriosis     Heart murmur     Heart murmur     Infection, Shigella 2005    Migraines     Ovarian cyst     Pain     PELVIC    PCOS (polycystic ovarian syndrome)     Pelvic pain     Wrist fracture        Past Surgical History:   Procedure Laterality Date    ABDOMINAL SURGERY       SECTION      CYSTOSCOPY BLADDER HYDRODISTENSION N/A 10/21/2020    Procedure: CYSTOSCOPY BLADDER HYDRODISTENSION;  Surgeon: Reji Baum MD;  Location: [facility];  Service: Urology;  Laterality: N/A;    DIAGNOSTIC LAPAROSCOPY      DIAGNOSTIC LAPAROSCOPY N/A 2016    Procedure: DIAGNOSTIC LAPAROSCOPY IUD REMOVAL, FULGERATION OF ENDOMETRIOSIS;  Surgeon: Chapin Russell DO;  Location: [facility];  Service:     DIAGNOSTIC " LAPAROSCOPY N/A 10/9/2019    Procedure: LAPAROSCOPY lysis of adhesions;  Surgeon: Chapin Russell DO;  Location: [facility] OR;  Service: Obstetrics/Gynecology       Family History: family history includes Diabetes in her maternal grandmother; Heart disease in her maternal grandfather; Kidney disease in her maternal grandfather; No Known Problems in her father and mother.     Social History:  reports that she has never smoked. She has never used smokeless tobacco. She reports that she does not drink alcohol and does not use drugs.    Home Medications:  bumetanide, cefdinir, cyclobenzaprine, and escitalopram    Allergies:  No Known Allergies    Objective    Objective     Vitals:   Temp:  [98 °F (36.7 °C)-98.2 °F (36.8 °C)] 98.2 °F (36.8 °C)  Heart Rate:  [] 76  Resp:  [15-18] 16  BP: ()/(65-88) 97/65    Physical Exam  Constitutional:       General: She is not in acute distress.     Appearance: She is well-developed.   HENT:      Head: Normocephalic and atraumatic.   Eyes:      Conjunctiva/sclera: Conjunctivae normal.   Neck:      Trachea: No tracheal deviation.   Cardiovascular:      Rate and Rhythm: Normal rate and regular rhythm.      Heart sounds: No murmur heard.     No friction rub. No gallop.      Comments: Trace edema B/L LE  Pulmonary:      Effort: No respiratory distress.      Breath sounds: Normal breath sounds. No wheezing or rales.   Abdominal:      General: Bowel sounds are normal. There is no distension.      Palpations: Abdomen is soft.      Tenderness: There is no abdominal tenderness. There is right CVA tenderness. There is no guarding.   Skin:     General: Skin is warm and dry.      Findings: No erythema or rash.   Neurological:      Mental Status: She is alert and oriented to person, place, and time.      Cranial Nerves: No cranial nerve deficit.         Result Review    Result Review:  I have personally reviewed the results from the time of this admission to 5/1/2025 21:38 EDT and  agree with these findings:  [x]  Laboratory list / accordion  []  Microbiology  [x]  Radiology  []  EKG/Telemetry   []  Cardiology/Vascular   []  Pathology  []  Old records  []  Other:  Most notable findings include:     Brief Urine Lab Results  (Last result in the past 365 days)        Color   Clarity   Blood   Leuk Est   Nitrite   Protein   CREAT   Urine HCG        05/01/25 1615 Orange  Comment: Dipstick results may be inaccurate due to color interference.       Cloudy   Large (3+)   Moderate (2+)   Positive   30 mg/dL (1+)                   Results from last 7 days   Lab Units 05/01/25  1615   WBC 10*3/mm3 6.63   HEMOGLOBIN g/dL 14.0   HEMATOCRIT % 43.3   PLATELETS 10*3/mm3 161     Results from last 7 days   Lab Units 05/01/25 2023 05/01/25  1615   SODIUM mmol/L 142 141   POTASSIUM mmol/L 4.0 4.1   CHLORIDE mmol/L 115* 108*   CO2 mmol/L 18.0* 21.5*   BUN mg/dL 21* 23*   CREATININE mg/dL 2.06* 2.57*   CALCIUM mg/dL 7.3* 9.2   BILIRUBIN mg/dL  --  1.8*   ALK PHOS U/L  --  60   ALT (SGPT) U/L  --  12   AST (SGOT) U/L  --  20   GLUCOSE mg/dL 110* 98     CT abdomen/pelvis with stone protocol (Images personally reviewed, per my view patient with tiny, R>L non obstructing stones, no hydronephrosis, no perinephric stranding)  Findings:     Non-obstructing bilateral renal stones  No hydronephrosis is seen.  There are no focal inflammatory changes identified.  No free air or fluid seen.  No dilated small or large bowel.  No grossly thickened bowel loops or appendix despite the lack of  contrast. Normal appendix.     IUD noted.      Visualized lung bases are negative.     IMPRESSION:     No acute process in the abdomen/pelvis.    Assessment & Plan   Assessment / Plan     #Acute non-oliguric kidney injury, suspect pre-renal,  poor oral intake +/- recently resolved obstruction  #Suspect acute cystitis with microscopic hematuria  #Previous recurrent urinary tract infection  #Previous nephrolithiasis  #Interstitial cystitis  -  Patient will be admitted to the medical surgical floor with telemetry  - Admission CT abdomen/pelvis with stone protocol revealed nonobstructing bilateral renal stones with no hydronephrosis and no focal inflammatory changes  - Baseline creatinine appears to be 0.9  - Admission BUN/creatinine 23 and 2.57; after 3L NS, repeat BUN/Creatinine 21/2.06  - Patient will be started on a sodium bicarbonate infusion with half-normal saline @ 150 ml/hr  - Repeat chemistry panel in a.m.   - Obtain CK level  - I have ordered a urine sodium level and urine creatinine  - Renally adjust medications based on current eGFR  - Plan to hold home Bumex for now  - Avoid NSAIDs  - Consider Nephrology consultation pending trend in renal function  - PRN Tylenol for mild pain, headache or fever, Norco 7.5/325 mg for moderate pain  - Will add PRN robaxin   - Urinalysis reviewed; appears suggestive for UTI - 7-12 sq epithelial cells noted   - Will follow up final urine culture result  - Continue empiric IV antibiotic therapy with Rocephin  - Admission WBC WNL at 6.63  - Will repeat CBC in a.m. and monitor temperature curve    Chronic medical conditions:  -Bilateral leg edema on bumex as needed  -IBS-C  -Endometriosis  -Depression    VTE Prophylaxis:  SCDs    CODE STATUS:    Code Status (Patient has no pulse and is not breathing): CPR (Attempt to Resuscitate)  Medical Interventions (Patient has pulse or is breathing): Full Support    Admission Status:  I believe this patient meets inpatient status.    Case d/w patient and her mother at bedside.    Ratna Abad DO

## 2025-05-02 NOTE — PAYOR COMM NOTE
"CONTACT: MARICEL WOLFF RN  UTILIZATION MANAGEMENT DEPT.  [facility]  [address] [facility]  [place] [address]  PHONE: [phone]  FAX: [phone]      INPATIENT AUTH REQUEST    Viraj Seo (35 y.o. Female)       Date of Birth   1990    Social Security Number       Address   [address] [place] [address] [address] [place] [address]    Home Phone   [phone]    MRN   [id number]       Yarsani   [facility], [place]    Marital Status                               Admission Date   2025    Admission Type   Emergency    Admitting Provider   Ratna Abad DO    Attending Provider   Misha London MD    Department, Room/Bed   [address], Critical access hospital8/       Discharge Date       Discharge Disposition       Discharge Destination                                 Attending Provider: Misha London MD    Allergies: No Known Allergies    Isolation: None   Infection: None   Code Status: CPR    Ht: 167.6 cm (66\")   Wt: 57.1 kg (125 lb 14.1 oz)    Admission Cmt: None   Principal Problem: HUBER (acute kidney injury) [N17.9]                   Active Insurance as of 2025       Primary Coverage       Payor Plan Insurance Group Employer/Plan Group    [place] [employer] [place] [place] [employer] [place] [place]        Payor Plan Address Payor Plan Phone Number Payor Plan Fax Number Effective Dates    [address]   2020 - None Entered    [facility] [id number]         Subscriber Name Subscriber Birth Date Member ID       VIRAJ SEO 1990 [id number]                     Emergency Contacts        (Rel.) Home Phone Work Phone Mobile Phone    Lawrence Seo (Spouse) [phone] -- [phone]    Rebeca Narvaez (Mother) [phone] -- --                 History & Physical        Ratna Abad DO at 26          [facility]   HISTORY AND PHYSICAL    Patient Name: Viraj Seo  : 1990  MRN: [id number]  Primary Care Physician:  Andrea Scruggs, APRN  Date of " "admission: 5/1/2025    Subjective  Subjective     Chief Complaint: Bilateral flank pain    History of Present Illness the patient is a 35-year-old female with past medical history significant for IBS/C, endometriosis, recurrent cystitis/urinary tract infections and interstitial cystitis who presents to the emergency department complaining of bilateral flank pain.    Patient seen and examined in . Mother is present at bedside.  Patient states that symptoms started on Tuesday.  She reported dysuria and initially anterior abdominal pain which is now bilateral, right greater than left flank pain.  She reports severe nausea without vomiting.  No stone sediment noted.  She states that her urine is typically adrian in color, however, the patient's mother states that it now is \"the color of the brodie.\"  She denies any urinary frequency and states that on Tuesday she did not have great urine output but since then her urine output appears normal.    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  She states that she took this medication on Tuesday and Wednesday and has had 1 dose of Pyridium.  She denies any NSAIDs and states that she has only been taking Tylenol as needed.  She has had a subjective fever but no recorded fever, however, this could be masked by Tylenol.    Patient denies any Bactrim use.  She states that she does have a prescription for Bumex but she only takes this as needed and has not had this medication any at all this week.  She does report poor oral intake.  She denies any diarrhea, shortness of air and/or cough.    While in the emergency department, patient received 1 g ceftriaxone, 30 mg IV ketorolac, 2 mg IV morphine, 4 mg IV Zofran and 10 mg IV Compazine with a total of 3 L NS    Review of Systems   Constitutional:  Positive for fever (\"felt warm\" no fever but had taken tylenol). Negative for chills and diaphoresis.   HENT:  Negative for hearing " loss and trouble swallowing.    Eyes:  Negative for discharge and visual disturbance.   Respiratory:  Negative for cough, shortness of breath and wheezing.    Cardiovascular:  Negative for chest pain, palpitations and leg swelling.   Gastrointestinal:  Positive for abdominal pain and nausea. Negative for constipation, diarrhea and vomiting.   Endocrine: Negative for polydipsia, polyphagia and polyuria.   Genitourinary:  Positive for dysuria and flank pain (B/L, R>L). Negative for decreased urine volume, difficulty urinating, frequency and hematuria.   Musculoskeletal:  Positive for back pain (See above). Negative for gait problem, myalgias and neck pain.   Skin:  Negative for rash.   Neurological:  Negative for dizziness, tremors, seizures, syncope, weakness and light-headedness.   Hematological:  Does not bruise/bleed easily.   Psychiatric/Behavioral:  Negative for agitation and confusion.         Personal History     Past Medical History:   Diagnosis Date    Anxiety     Back pain     Constipation     Depression     Endometriosis     Heart murmur     Heart murmur     Infection, Shigella 2005    Migraines     Ovarian cyst     Pain     PELVIC    PCOS (polycystic ovarian syndrome)     Pelvic pain     Wrist fracture        Past Surgical History:   Procedure Laterality Date    ABDOMINAL SURGERY       SECTION      CYSTOSCOPY BLADDER HYDRODISTENSION N/A 10/21/2020    Procedure: CYSTOSCOPY BLADDER HYDRODISTENSION;  Surgeon: Reji Baum MD;  Location: [facility] OR;  Service: Urology;  Laterality: N/A;    DIAGNOSTIC LAPAROSCOPY      DIAGNOSTIC LAPAROSCOPY N/A 2016    Procedure: DIAGNOSTIC LAPAROSCOPY IUD REMOVAL, FULGERATION OF ENDOMETRIOSIS;  Surgeon: Chapin Russell DO;  Location: [facility] OR;  Service:     DIAGNOSTIC LAPAROSCOPY N/A 10/9/2019    Procedure: LAPAROSCOPY lysis of adhesions;  Surgeon: Chapin Russell DO;  Location: [facility] OR;  Service: Obstetrics/Gynecology       Family  History: family history includes Diabetes in her maternal grandmother; Heart disease in her maternal grandfather; Kidney disease in her maternal grandfather; No Known Problems in her father and mother.     Social History:  reports that she has never smoked. She has never used smokeless tobacco. She reports that she does not drink alcohol and does not use drugs.    Home Medications:  bumetanide, cefdinir, cyclobenzaprine, and escitalopram    Allergies:  No Known Allergies    Objective   Objective     Vitals:   Temp:  [98 °F (36.7 °C)-98.2 °F (36.8 °C)] 98.2 °F (36.8 °C)  Heart Rate:  [] 76  Resp:  [15-18] 16  BP: ()/(65-88) 97/65    Physical Exam  Constitutional:       General: She is not in acute distress.     Appearance: She is well-developed.   HENT:      Head: Normocephalic and atraumatic.   Eyes:      Conjunctiva/sclera: Conjunctivae normal.   Neck:      Trachea: No tracheal deviation.   Cardiovascular:      Rate and Rhythm: Normal rate and regular rhythm.      Heart sounds: No murmur heard.     No friction rub. No gallop.      Comments: Trace edema B/L LE  Pulmonary:      Effort: No respiratory distress.      Breath sounds: Normal breath sounds. No wheezing or rales.   Abdominal:      General: Bowel sounds are normal. There is no distension.      Palpations: Abdomen is soft.      Tenderness: There is no abdominal tenderness. There is right CVA tenderness. There is no guarding.   Skin:     General: Skin is warm and dry.      Findings: No erythema or rash.   Neurological:      Mental Status: She is alert and oriented to person, place, and time.      Cranial Nerves: No cranial nerve deficit.         Result Review   Result Review:  I have personally reviewed the results from the time of this admission to 5/1/2025 21:38 EDT and agree with these findings:  [x]  Laboratory list / accordion  []  Microbiology  [x]  Radiology  []  EKG/Telemetry   []  Cardiology/Vascular   []  Pathology  []  Old records  []   Other:  Most notable findings include:     Brief Urine Lab Results  (Last result in the past 365 days)        Color   Clarity   Blood   Leuk Est   Nitrite   Protein   CREAT   Urine HCG        05/01/25 1615 Orange  Comment: Dipstick results may be inaccurate due to color interference.       Cloudy   Large (3+)   Moderate (2+)   Positive   30 mg/dL (1+)                   Results from last 7 days   Lab Units 05/01/25  1615   WBC 10*3/mm3 6.63   HEMOGLOBIN g/dL 14.0   HEMATOCRIT % 43.3   PLATELETS 10*3/mm3 161     Results from last 7 days   Lab Units 05/01/25 2023 05/01/25  1615   SODIUM mmol/L 142 141   POTASSIUM mmol/L 4.0 4.1   CHLORIDE mmol/L 115* 108*   CO2 mmol/L 18.0* 21.5*   BUN mg/dL 21* 23*   CREATININE mg/dL 2.06* 2.57*   CALCIUM mg/dL 7.3* 9.2   BILIRUBIN mg/dL  --  1.8*   ALK PHOS U/L  --  60   ALT (SGPT) U/L  --  12   AST (SGOT) U/L  --  20   GLUCOSE mg/dL 110* 98     CT abdomen/pelvis with stone protocol (Images personally reviewed, per my view patient with tiny, R>L non obstructing stones, no hydronephrosis, no perinephric stranding)  Findings:     Non-obstructing bilateral renal stones  No hydronephrosis is seen.  There are no focal inflammatory changes identified.  No free air or fluid seen.  No dilated small or large bowel.  No grossly thickened bowel loops or appendix despite the lack of  contrast. Normal appendix.     IUD noted.      Visualized lung bases are negative.     IMPRESSION:     No acute process in the abdomen/pelvis.    Assessment & Plan  Assessment / Plan     #Acute non-oliguric kidney injury, suspect pre-renal,  poor oral intake +/- recently resolved obstruction  #Suspect acute cystitis with microscopic hematuria  #Previous recurrent urinary tract infection  #Previous nephrolithiasis  #Interstitial cystitis  - Patient will be admitted to the medical surgical floor with telemetry  - Admission CT abdomen/pelvis with stone protocol revealed nonobstructing bilateral renal stones with no  hydronephrosis and no focal inflammatory changes  - Baseline creatinine appears to be 0.9  - Admission BUN/creatinine 23 and 2.57; after 3L NS, repeat BUN/Creatinine 21/2.06  - Patient will be started on a sodium bicarbonate infusion with half-normal saline @ 150 ml/hr  - Repeat chemistry panel in a.m.   - Obtain CK level  - I have ordered a urine sodium level and urine creatinine  - Renally adjust medications based on current eGFR  - Plan to hold home Bumex for now  - Avoid NSAIDs  - Consider Nephrology consultation pending trend in renal function  - PRN Tylenol for mild pain, headache or fever, Norco 7.5/325 mg for moderate pain  - Will add PRN robaxin   - Urinalysis reviewed; appears suggestive for UTI - 7-12 sq epithelial cells noted   - Will follow up final urine culture result  - Continue empiric IV antibiotic therapy with Rocephin  - Admission WBC WNL at 6.63  - Will repeat CBC in a.m. and monitor temperature curve    Chronic medical conditions:  -Bilateral leg edema on bumex as needed  -IBS-C  -Endometriosis  -Depression    VTE Prophylaxis:  SCDs    CODE STATUS:    Code Status (Patient has no pulse and is not breathing): CPR (Attempt to Resuscitate)  Medical Interventions (Patient has pulse or is breathing): Full Support    Admission Status:  I believe this patient meets inpatient status.    Case d/w patient and her mother at bedside.    Ratna Abad DO    Electronically signed by Ratna Abad DO at 05/01/25 2200          Emergency Department Notes        Kilo Amin RN at 05/01/25 2154          Report given to Vivian MENJIVAR on 3N    Electronically signed by Kilo Amin RN at 05/01/25 2154       Sandra Shaver at 05/01/25 1912          Nurse instructed not to get labs yet    Electronically signed by Sandra Shaver at 05/01/25 2004       Yaneth Marin, APRN at 05/01/25 1813          Subjective   History of Present Illness  Patient reports that she has had bilateral  flank pain with right side worst with nausea since Tuesday.    History provided by:  Patient   used: No    Dysuria  Pain quality:  Aching  Pain severity:  Moderate  Onset quality:  Sudden  Duration:  3 days  Timing:  Constant  Progression:  Worsening  Chronicity:  Recurrent  Recent urinary tract infections: yes    Relieved by:  None tried  Ineffective treatments:  None tried      Review of Systems   Genitourinary:  Positive for dysuria.       Past Medical History:   Diagnosis Date    Anxiety     Back pain     Constipation     Depression     Endometriosis     Heart murmur     Heart murmur     Infection, Shigella 2005    Migraines     Ovarian cyst     Pain     PELVIC    PCOS (polycystic ovarian syndrome)     Pelvic pain     Wrist fracture        No Known Allergies    Past Surgical History:   Procedure Laterality Date    ABDOMINAL SURGERY       SECTION      CYSTOSCOPY BLADDER HYDRODISTENSION N/A 10/21/2020    Procedure: CYSTOSCOPY BLADDER HYDRODISTENSION;  Surgeon: Reji Baum MD;  Location: [facility];  Service: Urology;  Laterality: N/A;    DIAGNOSTIC LAPAROSCOPY      DIAGNOSTIC LAPAROSCOPY N/A 2016    Procedure: DIAGNOSTIC LAPAROSCOPY IUD REMOVAL, FULGERATION OF ENDOMETRIOSIS;  Surgeon: Chapin Russell DO;  Location: [facility];  Service:     DIAGNOSTIC LAPAROSCOPY N/A 10/9/2019    Procedure: LAPAROSCOPY lysis of adhesions;  Surgeon: Chapin Russell DO;  Location: [facility];  Service: Obstetrics/Gynecology       Family History   Problem Relation Age of Onset    No Known Problems Father     No Known Problems Mother     Diabetes Maternal Grandmother     Kidney disease Maternal Grandfather     Heart disease Maternal Grandfather        Social History     Socioeconomic History    Marital status:    Tobacco Use    Smoking status: Never    Smokeless tobacco: Never   Vaping Use    Vaping status: Never Used   Substance and Sexual Activity    Alcohol use: No     Drug use: No    Sexual activity: Yes     Partners: Male     Birth control/protection: I.U.D.           Objective   Physical Exam  Vitals and nursing note reviewed.   Constitutional:       Appearance: She is well-developed.   HENT:      Head: Normocephalic.   Cardiovascular:      Rate and Rhythm: Normal rate and regular rhythm.   Pulmonary:      Effort: Pulmonary effort is normal.      Breath sounds: Normal breath sounds.   Abdominal:      General: Bowel sounds are normal.      Palpations: Abdomen is soft.      Tenderness: There is no abdominal tenderness.   Musculoskeletal:         General: Normal range of motion.      Cervical back: Neck supple.   Skin:     General: Skin is warm and dry.   Neurological:      Mental Status: She is alert and oriented to person, place, and time.   Psychiatric:         Behavior: Behavior normal.         Thought Content: Thought content normal.         Judgment: Judgment normal.       Results for orders placed or performed during the hospital encounter of 05/01/25   Comprehensive Metabolic Panel    Collection Time: 05/01/25  4:15 PM    Specimen: Blood   Result Value Ref Range    Glucose 98 65 - 99 mg/dL    BUN 23 (H) 6 - 20 mg/dL    Creatinine 2.57 (H) 0.57 - 1.00 mg/dL    Sodium 141 136 - 145 mmol/L    Potassium 4.1 3.5 - 5.2 mmol/L    Chloride 108 (H) 98 - 107 mmol/L    CO2 21.5 (L) 22.0 - 29.0 mmol/L    Calcium 9.2 8.6 - 10.5 mg/dL    Total Protein 7.2 6.0 - 8.5 g/dL    Albumin 4.5 3.5 - 5.2 g/dL    ALT (SGPT) 12 1 - 33 U/L    AST (SGOT) 20 1 - 32 U/L    Alkaline Phosphatase 60 39 - 117 U/L    Total Bilirubin 1.8 (H) 0.0 - 1.2 mg/dL    Globulin 2.7 gm/dL    A/G Ratio 1.7 g/dL    BUN/Creatinine Ratio 8.9 7.0 - 25.0    Anion Gap 11.5 5.0 - 15.0 mmol/L    eGFR 24.5 (L) >60.0 mL/min/1.73   Urinalysis With Culture If Indicated - Urine, Clean Catch    Collection Time: 05/01/25  4:15 PM    Specimen: Urine, Clean Catch   Result Value Ref Range    Color, UA Orange (A) Yellow, Straw     Appearance, UA Cloudy (A) Clear    pH, UA <=5.0 5.0 - 8.0    Specific Gravity, UA 1.016 1.005 - 1.030    Glucose, UA Negative Negative    Ketones, UA Trace (A) Negative    Bilirubin, UA Small (1+) (A) Negative    Blood, UA Large (3+) (A) Negative    Protein, UA 30 mg/dL (1+) (A) Negative    Leuk Esterase, UA Moderate (2+) (A) Negative    Nitrite, UA Positive (A) Negative    Urobilinogen, UA 1.0 E.U./dL 0.2 - 1.0 E.U./dL   CBC Auto Differential    Collection Time: 05/01/25  4:15 PM    Specimen: Blood   Result Value Ref Range    WBC 6.63 3.40 - 10.80 10*3/mm3    RBC 4.61 3.77 - 5.28 10*6/mm3    Hemoglobin 14.0 12.0 - 15.9 g/dL    Hematocrit 43.3 34.0 - 46.6 %    MCV 93.9 79.0 - 97.0 fL    MCH 30.4 26.6 - 33.0 pg    MCHC 32.3 31.5 - 35.7 g/dL    RDW 12.5 12.3 - 15.4 %    RDW-SD 43.1 37.0 - 54.0 fl    MPV 11.1 6.0 - 12.0 fL    Platelets 161 140 - 450 10*3/mm3    Neutrophil % 71.6 42.7 - 76.0 %    Lymphocyte % 18.7 (L) 19.6 - 45.3 %    Monocyte % 6.6 5.0 - 12.0 %    Eosinophil % 2.1 0.3 - 6.2 %    Basophil % 0.8 0.0 - 1.5 %    Immature Grans % 0.2 0.0 - 0.5 %    Neutrophils, Absolute 4.75 1.70 - 7.00 10*3/mm3    Lymphocytes, Absolute 1.24 0.70 - 3.10 10*3/mm3    Monocytes, Absolute 0.44 0.10 - 0.90 10*3/mm3    Eosinophils, Absolute 0.14 0.00 - 0.40 10*3/mm3    Basophils, Absolute 0.05 0.00 - 0.20 10*3/mm3    Immature Grans, Absolute 0.01 0.00 - 0.05 10*3/mm3    nRBC 0.0 0.0 - 0.2 /100 WBC   Urinalysis, Microscopic Only - Urine, Clean Catch    Collection Time: 05/01/25  4:15 PM    Specimen: Urine, Clean Catch   Result Value Ref Range    RBC, UA Too Numerous to Count (A) None Seen, 0-2 /HPF    WBC, UA 21-50 (A) None Seen, 0-2 /HPF    Bacteria, UA None Seen None Seen /HPF    Squamous Epithelial Cells, UA 7-12 (A) None Seen, 0-2 /HPF    Hyaline Casts, UA 3-6 None Seen /LPF    Methodology Automated Microscopy    Green Top (Gel)    Collection Time: 05/01/25  4:15 PM   Result Value Ref Range    Extra Tube Hold for add-ons.     Lavender Top    Collection Time: 05/01/25  4:15 PM   Result Value Ref Range    Extra Tube hold for add-on    Gold Top - SST    Collection Time: 05/01/25  4:15 PM   Result Value Ref Range    Extra Tube Hold for add-ons.    Light Blue Top    Collection Time: 05/01/25  4:15 PM   Result Value Ref Range    Extra Tube Hold for add-ons.    Lactic Acid, Plasma    Collection Time: 05/01/25  5:00 PM    Specimen: Blood   Result Value Ref Range    Lactate 0.9 0.5 - 2.0 mmol/L   Basic Metabolic Panel    Collection Time: 05/01/25  8:23 PM    Specimen: Blood   Result Value Ref Range    Glucose 110 (H) 65 - 99 mg/dL    BUN 21 (H) 6 - 20 mg/dL    Creatinine 2.06 (H) 0.57 - 1.00 mg/dL    Sodium 142 136 - 145 mmol/L    Potassium 4.0 3.5 - 5.2 mmol/L    Chloride 115 (H) 98 - 107 mmol/L    CO2 18.0 (L) 22.0 - 29.0 mmol/L    Calcium 7.3 (L) 8.6 - 10.5 mg/dL    BUN/Creatinine Ratio 10.2 7.0 - 25.0    Anion Gap 9.0 5.0 - 15.0 mmol/L    eGFR 31.9 (L) >60.0 mL/min/1.73     CT Abdomen Pelvis Stone Protocol  Result Date: 5/1/2025   No acute process in the abdomen/pelvis.  This report was finalized on 5/1/2025 5:35 PM by Jenna Sneed MD.          Procedures          ED Course     ED Course as of 05/01/25 2123   Thu May 01, 2025   2118 Spoke with Dr. Abad who accepts the patient to the hospitalist team. [KH]      ED Course User Index  [KH] Yaneth Marin, APRN                                                       Medical Decision Making  Problems Addressed:  Acute cystitis with hematuria: complicated acute illness or injury  HUBER (acute kidney injury): complicated acute illness or injury    Amount and/or Complexity of Data Reviewed  Labs: ordered.  Radiology: ordered.    Risk  OTC drugs.  Prescription drug management.  Decision regarding hospitalization.        Final diagnoses:   HUBER (acute kidney injury)   Acute cystitis with hematuria       ED Disposition  ED Disposition       ED Disposition   Decision to Admit    Condition   --     Comment   Level of Care: Medical Telemetry [23]   Diagnosis: HUBER (acute kidney injury) [[id number]]   Admitting Physician: AMY NAGY [[id number]]   Certification: I Certify That Inpatient Hospital Services Are Medically Necessary For Greater Than 2 Midnights                 Andrea Scruggs, APRN  [address] [address]  [phone]    In 2 days           Medication List        New Prescriptions      cefdinir 300 MG capsule  Commonly known as: OMNICEF  Take 1 capsule by mouth 2 (Two) Times a Day for 7 days.            Stop      promethazine 25 MG tablet  Commonly known as: PHENERGAN               Where to Get Your Medications        These medications were sent to [facility] [address] [place] [address] - [phone] [place] [id number] [address] [address]      Phone: [phone]   cefdinir 300 MG capsule            Yaneth Marin APRN  05/01/25 2123      Electronically signed by Yaneth Marin APRN at 05/01/25 2123       Facility-Administered Medications as of 5/2/2025   Medication Dose Route Frequency Provider Last Rate Last Admin    acetaminophen (TYLENOL) tablet 1,000 mg  1,000 mg Oral Q8H PRN Amy Nagy DO        sennosides-docusate (PERICOLACE) 8.6-50 MG per tablet 2 tablet  2 tablet Oral BID PRN Amy Ngay DO        And    polyethylene glycol (MIRALAX) packet 17 g  17 g Oral Daily PRN Amy Nagy DO        And    bisacodyl (DULCOLAX) EC tablet 5 mg  5 mg Oral Daily PRN Amy Nagy DO        And    bisacodyl (DULCOLAX) suppository 10 mg  10 mg Rectal Daily PRN Amy Nagy DO        [COMPLETED] cefTRIAXone (ROCEPHIN) 1,000 mg in sodium chloride 0.9 % 100 mL IVPB-VTB  1,000 mg Intravenous Once Brii Mann PA   Stopped at 05/01/25 1805    cefTRIAXone (ROCEPHIN) 1,000 mg in sodium chloride 0.9 % 100 mL IVPB-VTB  1,000 mg Intravenous Q24H Amy Nagy DO        escitalopram  (LEXAPRO) tablet 40 mg  40 mg Oral Daily Ratna Abad DO   40 mg at 25 0834    HYDROcodone-acetaminophen (NORCO) 7.5-325 MG per tablet 1 tablet  1 tablet Oral Q6H PRN Ratna Abad DO        [COMPLETED] ketorolac (TORADOL) injection 30 mg  30 mg Intravenous Once Brii Mann PA   30 mg at 25 1620    Lidocaine 4 % 1 patch  1 patch Transdermal Daily PRN Ratna Abad DO        [COMPLETED] morphine injection 2 mg  2 mg Intravenous Once Joon Sandra MD   2 mg at 25 2124    nitroglycerin (NITROSTAT) SL tablet 0.4 mg  0.4 mg Sublingual Q5 Min PRN Ratna Abad DO        [COMPLETED] ondansetron (ZOFRAN) injection 4 mg  4 mg Intravenous Once Brii Mann PA   4 mg at 25 1620    ondansetron ODT (ZOFRAN-ODT) disintegrating tablet 8 mg  8 mg Translingual Q6H PRN Ratna Abad DO        [COMPLETED] prochlorperazine (COMPAZINE) injection 10 mg  10 mg Intravenous Once Brii Mann PA   10 mg at 25    prochlorperazine (COMPAZINE) injection 5 mg  5 mg Intravenous Q6H PRN Ratna Abad DO        promethazine (PHENERGAN) tablet 25 mg  25 mg Oral Q6H PRN Ratna Abad DO        [] sodium bicarbonate 8.4 % 75 mEq in sodium chloride 0.45 % 1,000 mL infusion (less than/equal to 100 mEq)  75 mEq Intravenous Continuous Ratna Abad  mL/hr at 25 0834 75 mEq at 25 0834    [COMPLETED] sodium chloride 0.9 % bolus 1,000 mL  1,000 mL Intravenous Once Brii Mann PA   Stopped at 25 1650    [COMPLETED] sodium chloride 0.9 % bolus 1,000 mL  1,000 mL Intravenous Once Brii Mann PA   Stopped at 25 1840    [COMPLETED] sodium chloride 0.9 % bolus 1,000 mL  1,000 mL Intravenous Once Brii Mann PA   Stopped at 25 2122    sodium chloride 0.9 % flush 10 mL  10 mL Intravenous Q12H Ratna Abad DO   10 mL at 25 0007    sodium chloride 0.9 % flush 10 mL  10 mL  Intravenous PRN Ratna Abad DO        sodium chloride 0.9 % infusion 40 mL  40 mL Intravenous PRN Ratna Abad DO        tiZANidine (ZANAFLEX) tablet 2 mg  2 mg Oral Daily PRN Misha London MD        topiramate (TOPAMAX) tablet 50 mg  50 mg Oral Daily Ratna Abad DO   50 mg at 05/02/25 0834    traZODone (DESYREL) tablet 50 mg  50 mg Oral Nightly Becca Sanchez APRN   50 mg at 05/02/25 0057     Orders (all)        Start     Ordered    05/02/25 2100  traZODone (DESYREL) tablet 50 mg  Nightly,   Status:  Discontinued         05/02/25 0049    05/02/25 1700  cefTRIAXone (ROCEPHIN) 1,000 mg in sodium chloride 0.9 % 100 mL IVPB-VTB  Every 24 Hours         05/01/25 2228 05/02/25 0925  tiZANidine (ZANAFLEX) tablet 2 mg  Daily PRN         05/02/25 0925    05/02/25 0900  escitalopram (LEXAPRO) tablet 40 mg  Daily         05/01/25 2309 05/02/25 0900  topiramate (TOPAMAX) tablet 50 mg  Daily         05/01/25 2309 05/02/25 0800  Oral Care  2 Times Daily       05/01/25 2228 05/02/25 0600  CBC & Differential  Morning Draw         05/01/25 2228 05/02/25 0600  Comprehensive Metabolic Panel  Morning Draw         05/01/25 2228    05/02/25 0600  CBC Auto Differential  PROCEDURE ONCE         05/01/25 2228 05/02/25 0243  Scan Slide  Once,   Status:  Canceled         05/02/25 0242    05/02/25 0145  traZODone (DESYREL) tablet 50 mg  Nightly         05/02/25 0050    05/02/25 0000  Vital Signs  Every 4 Hours      Comments: Per per hospital policy    05/01/25 2228 05/02/25 0000  traZODone (DESYREL) tablet 100 mg  Nightly,   Status:  Discontinued         05/01/25 2309 05/01/25 2319  Fentanyl, Urine - Urine, Clean Catch  Once         05/01/25 2318    05/01/25 2315  sodium chloride 0.9 % flush 10 mL  Every 12 Hours Scheduled         05/01/25 2227    05/01/25 3424  sodium bicarbonate 8.4 % 75 mEq in sodium chloride 0.45 % 1,000 mL infusion (less than/equal to 100 mEq)  Continuous          05/01/25 2228 05/01/25 2309  Lidocaine 4 % 1 patch  Daily PRN         05/01/25 2309    05/01/25 2309  ondansetron ODT (ZOFRAN-ODT) disintegrating tablet 8 mg  Every 6 Hours PRN         05/01/25 2309 05/01/25 2309  promethazine (PHENERGAN) tablet 25 mg  Every 6 Hours PRN         05/01/25 2309 05/01/25 2309  tiZANidine (ZANAFLEX) tablet 4 mg  Nightly PRN,   Status:  Discontinued         05/01/25 2309 05/01/25 2229  Notify Physician (with Parameters)  Until Discontinued         05/01/25 2228 05/01/25 2229  Intake & Output  Every Shift       05/01/25 2228 05/01/25 2229  Weigh patient  Once         05/01/25 2228 05/01/25 2229  Urine Drug Screen - Urine, Clean Catch  STAT         05/01/25 2228 05/01/25 2229  ECG 12 Lead QT Measurement  Once         05/01/25 2228 05/01/25 2229  Insert Peripheral IV  Once         05/01/25 2228 05/01/25 2229  Saline Lock & Maintain IV Access  Continuous,   Status:  Canceled         05/01/25 2228 05/01/25 2229  Place Sequential Compression Device  Once         05/01/25 2228 05/01/25 2229  Maintain Sequential Compression Device  Continuous         05/01/25 2228 05/01/25 2229  Continuous Cardiac Monitoring  Continuous        Comments: Follow Standing Orders As Outlined in Process Instructions (Open Order Report to View Full Instructions)    05/01/25 2228 05/01/25 2229  Maintain IV Access  Continuous         05/01/25 2228 05/01/25 2229  Telemetry - Place Orders & Notify Provider of Results When Patient Experiences Acute Chest Pain, Dysrhythmia or Respiratory Distress  Continuous        Comments: Open Order Report to View Parameters Requiring Provider Notification    05/01/25 2228 05/01/25 2229  May Be Off Telemetry for Tests  Continuous         05/01/25 2228 05/01/25 2229  Diet: Renal; Low Sodium (2-3g), Low Potassium, Low Phosphorus; Fluid Consistency: Thin (IDDSI 0)  Diet Effective Now         05/01/25 2228    05/01/25 2229  Sodium, Urine,  "Random - Urine, Clean Catch  Once         05/01/25 2228    05/01/25 2229  Creatinine Urine Random (kidney function) GFR component - Urine, Clean Catch  Once         05/01/25 2228 05/01/25 2229  CK  STAT         05/01/25 2228 05/01/25 2228  sodium chloride 0.9 % flush 10 mL  As Needed         05/01/25 2228    05/01/25 2228  sodium chloride 0.9 % infusion 40 mL  As Needed         05/01/25 2228 05/01/25 2228  nitroglycerin (NITROSTAT) SL tablet 0.4 mg  Every 5 Minutes PRN         05/01/25 2228    05/01/25 2228  sennosides-docusate (PERICOLACE) 8.6-50 MG per tablet 2 tablet  2 Times Daily PRN        Placed in \"And\" Linked Group    05/01/25 2228    05/01/25 2228  polyethylene glycol (MIRALAX) packet 17 g  Daily PRN        Placed in \"And\" Linked Group    05/01/25 2228    05/01/25 2228  bisacodyl (DULCOLAX) EC tablet 5 mg  Daily PRN        Placed in \"And\" Linked Group    05/01/25 2228    05/01/25 2228  bisacodyl (DULCOLAX) suppository 10 mg  Daily PRN        Placed in \"And\" Linked Group    05/01/25 2228    05/01/25 2228  acetaminophen (TYLENOL) tablet 1,000 mg  Every 8 Hours PRN         05/01/25 2228    05/01/25 2228  HYDROcodone-acetaminophen (NORCO) 7.5-325 MG per tablet 1 tablet  Every 6 Hours PRN         05/01/25 2228    05/01/25 2228  methocarbamol (ROBAXIN) tablet 500 mg  Every 8 Hours PRN,   Status:  Discontinued         05/01/25 2228 05/01/25 2228  prochlorperazine (COMPAZINE) injection 5 mg  Every 6 Hours PRN         05/01/25 2228    05/01/25 2121  morphine injection 2 mg  Once         05/01/25 2105 05/01/25 2119  Code Status and Medical Interventions: CPR (Attempt to Resuscitate); Full Support  Continuous         05/01/25 2121 05/01/25 2118  Inpatient Admission  Once         05/01/25 2121 05/01/25 2113  Hospitalist (on-call MD unless specified)  Once        Specialty:  Hospitalist  Provider:  (Not yet assigned)    05/01/25 2112 05/01/25 2105  Calcium Replacement - Follow Nurse / BPA Driven " Protocol  As Needed,   Status:  Discontinued         05/01/25 2105 05/01/25 2105  Potassium Replacement - Follow Nurse / BPA Driven Protocol  As Needed,   Status:  Discontinued         05/01/25 2105 05/01/25 2105  Magnesium Standard Dose Replacement - Follow Nurse / BPA Driven Protocol  As Needed,   Status:  Discontinued         05/01/25 2105 05/01/25 2105  Phosphorus Replacement - Follow Nurse / BPA Driven Protocol  As Needed,   Status:  Discontinued         05/01/25 2105 05/01/25 2012  prochlorperazine (COMPAZINE) injection 10 mg  Once         05/01/25 1956    05/01/25 1925  sodium chloride 0.9 % bolus 1,000 mL  Once         05/01/25 1909    05/01/25 1910  Basic Metabolic Panel  Once         05/01/25 1909    05/01/25 1802  sodium chloride 0.9 % bolus 1,000 mL  Once         05/01/25 1746    05/01/25 1714  cefTRIAXone (ROCEPHIN) 1,000 mg in sodium chloride 0.9 % 100 mL IVPB-VTB  Once         05/01/25 1658    05/01/25 1704  CT Abdomen Pelvis Stone Protocol  1 Time Imaging         05/01/25 1703    05/01/25 1659  Lactic Acid, Plasma  Once         05/01/25 1658    05/01/25 1634  Urine Culture - Urine, Urine, Clean Catch  Once         05/01/25 1633    05/01/25 1633  sodium chloride 0.9 % bolus 1,000 mL  Once         05/01/25 1617    05/01/25 1633  ketorolac (TORADOL) injection 30 mg  Once         05/01/25 1617    05/01/25 1633  ondansetron (ZOFRAN) injection 4 mg  Once         05/01/25 1617    05/01/25 1625  Urinalysis, Microscopic Only - Urine, Clean Catch  Once         05/01/25 1624    05/01/25 1611  CBC & Differential  Once         05/01/25 1610    05/01/25 1611  Comprehensive Metabolic Panel  Once         05/01/25 1610    05/01/25 1611  [place] Draw  Once         05/01/25 1610    05/01/25 1611  Urinalysis With Culture If Indicated - Urine, Clean Catch  Once         05/01/25 1610    05/01/25 1611  CBC Auto Differential  PROCEDURE ONCE         05/01/25 1611    05/01/25 1611  Green Top (Gel)  PROCEDURE ONCE          05/01/25 1611    05/01/25 1611  Lavender Top  PROCEDURE ONCE         05/01/25 1611    05/01/25 1611  Gold Top - SST  PROCEDURE ONCE         05/01/25 1611    05/01/25 1611  Light Blue Top  PROCEDURE ONCE         05/01/25 1611    05/01/25 0000  cefdinir (OMNICEF) 300 MG capsule  2 Times Daily         05/01/25 1743    05/01/25 0000  Telemetry Scan  Once         05/01/25 0000    Unscheduled  Up With Assistance  As Needed       05/01/25 2228    --  traZODone (DESYREL) 100 MG tablet  Nightly         05/01/25 2149    --  topiramate (TOPAMAX) 50 MG tablet  Daily         05/01/25 2149    --  tiZANidine (ZANAFLEX) 4 MG tablet  Nightly PRN,   Status:  Discontinued         05/01/25 2156    --  promethazine (PHENERGAN) 25 MG tablet  Every 6 Hours PRN         05/01/25 2156    --  potassium chloride (MICRO-K) 10 MEQ CR capsule  Daily PRN         05/01/25 2156    --  furosemide (LASIX) 20 MG tablet  Daily PRN         05/01/25 2156    --  ondansetron ODT (ZOFRAN-ODT) 8 MG disintegrating tablet  Every 6 Hours PRN         05/01/25 2156    --  lidocaine (LIDODERM) 5 %  Daily PRN         05/01/25 2156    --  ibuprofen (ADVIL,MOTRIN) 800 MG tablet  2 Times Daily         05/01/25 2156    --  fluconazole (DIFLUCAN) 150 MG tablet  As Needed         05/01/25 2156    --  escitalopram (LEXAPRO) 20 MG tablet  Daily         05/01/25 2156    --  tiZANidine (ZANAFLEX) 2 MG tablet  Daily PRN         05/02/25 0913                  Physician Progress Notes (all)    No notes of this type exist for this encounter.       Consult Notes (all)    No notes of this type exist for this encounter.

## 2025-05-02 NOTE — PROGRESS NOTES
[facility] HOSPITALIST PROGRESS NOTE     Patient Identification:  Name:  Latisha Mccain  Age:  35 y.o.  Sex:  female  :  1990  MRN:  [id number]  Visit Number:  [id number]  ROOM: [address] [place] [address]     Primary Care Provider:  Andrea Scruggs APRN    Length of stay in inpatient status:  1    Subjective     Chief Compliant:    Chief Complaint   Patient presents with    Flank Pain       History of Presenting Illness:    Patient reports feeling better. Hopeful to go home soon. Flank pain improved. Mild back pain. Family supportive bedside.     ROS:  Otherwise 10 point ROS negative other than documented above in HPI.     Objective     Current Hospital Meds:cefTRIAXone, 1,000 mg, Intravenous, Q24H  escitalopram, 40 mg, Oral, Daily  sodium chloride, 10 mL, Intravenous, Q12H  topiramate, 50 mg, Oral, Daily  traZODone, 50 mg, Oral, Nightly    sodium bicarbonate 8.4 % 75 mEq in sodium chloride 0.45 % 1,075 mL infusion (less than/equal to 100 mEq), 75 mEq        Current Antimicrobial Therapy:  Anti-Infectives (From admission, onward)      Ordered     Dose/Rate Route Frequency Start Stop    25 2228  cefTRIAXone (ROCEPHIN) 1,000 mg in sodium chloride 0.9 % 100 mL IVPB-VTB        Ordering Provider: Ratna Abad DO    1,000 mg  200 mL/hr over 30 Minutes Intravenous Every 24 Hours 25 1700 25 1659    25 1658  cefTRIAXone (ROCEPHIN) 1,000 mg in sodium chloride 0.9 % 100 mL IVPB-VTB        Ordering Provider: Brii Mann PA    1,000 mg  200 mL/hr over 30 Minutes Intravenous Once 25 1714 25 1805    25 1743  cefdinir (OMNICEF) 300 MG capsule        Ordering Provider: Brii Mann PA    300 mg Oral 2 Times Daily 25 0000 25 5179          Current Diuretic Therapy:  Diuretics (From admission, onward)      None          ----------------------------------------------------------------------------------------------------------------------  Vital Signs:  Temp:   [98 °F (36.7 °C)-98.6 °F (37 °C)] 98.2 °F (36.8 °C)  Heart Rate:  [] 81  Resp:  [15-16] 16  BP: ()/(57-88) 106/70  SpO2:  [92 %-100 %] 99 %  on   ;   Device (Oxygen Therapy): room air  Body mass index is 20.32 kg/m².    Wt Readings from Last 3 Encounters:   05/01/25 57.1 kg (125 lb 14.1 oz)   02/06/24 62.6 kg (138 lb)   01/06/22 67.1 kg (148 lb)     Intake & Output (last 3 days)         04/29 0701 04/30 0700 04/30 0701 05/01 0700 05/01 0701 05/02 0700 05/02 0701 05/03 0700    P.O.   240     Total Intake(mL/kg)   240 (4.2)     Net   +240             Urine Unmeasured Occurrence   1 x     Stool Unmeasured Occurrence   0 x           Diet: Renal; Low Sodium (2-3g), Low Potassium, Low Phosphorus; Fluid Consistency: Thin (IDDSI 0)  ----------------------------------------------------------------------------------------------------------------------  Physical exam:  Constitutional:  Well-developed and well-nourished.  No respiratory distress.      HENT:  Head:  Normocephalic and atraumatic.  Mouth:  Moist mucous membranes.    Eyes:  Conjunctivae and EOM are normal. No scleral icterus.    Neck:  Neck supple.  No JVD present.    Cardiovascular:  Normal rate, regular rhythm and normal heart sounds with no murmur.  Pulmonary/Chest:  No respiratory distress, no wheezes, no crackles, with normal breath sounds and good air movement.  Abdominal:  Soft.  Bowel sounds are normal.  No distension and no tenderness.   Musculoskeletal:  No edema, no tenderness, and no deformity.  No red or swollen joints anywhere.    Neurological:  Alert and oriented to person, place, and time.  No cranial nerve deficit.  No tongue deviation.  No facial droop.  No slurred speech.   Skin:  Skin is warm and dry. No rash noted. No pallor.   Peripheral vascular:  Pulses in all 4 extremities with no clubbing, no cyanosis, no  "edema.  ----------------------------------------------------------------------------------------------------------------------  Tele:    ----------------------------------------------------------------------------------------------------------------------  Results from last 7 days   Lab Units 05/02/25 0208 05/01/25  1700 05/01/25  1615   LACTATE mmol/L  --  0.9  --    WBC 10*3/mm3 5.48  --  6.63   HEMOGLOBIN g/dL 10.6*  --  14.0   HEMATOCRIT % 33.7*  --  43.3   MCV fL 96.0  --  93.9   MCHC g/dL 31.5  --  32.3   PLATELETS 10*3/mm3 123*  --  161         Results from last 7 days   Lab Units 05/02/25  1415 05/02/25 0208 05/01/25  2023 05/01/25  1615   SODIUM mmol/L 141 142 142 141   POTASSIUM mmol/L 4.4 4.1 4.0 4.1   CHLORIDE mmol/L 113* 115* 115* 108*   CO2 mmol/L 19.2* 19.6* 18.0* 21.5*   BUN mg/dL 15 20 21* 23*   CREATININE mg/dL 1.53* 1.92* 2.06* 2.57*   CALCIUM mg/dL 7.7* 7.2* 7.3* 9.2   GLUCOSE mg/dL 97 100* 110* 98   ALBUMIN g/dL  --  2.8*  --  4.5   BILIRUBIN mg/dL  --  0.6  --  1.8*   ALK PHOS U/L  --  43  --  60   AST (SGOT) U/L  --  15  --  20   ALT (SGPT) U/L  --  9  --  12   Estimated Creatinine Clearance: 46.7 mL/min (A) (by C-G formula based on SCr of 1.53 mg/dL (H)).  No results found for: \"AMMONIA\"  Results from last 7 days   Lab Units 05/01/25 2023   CK TOTAL U/L 45             No results found for: \"HGBA1C\", \"POCGLU\"  Lab Results   Component Value Date    TSH 2.510 03/11/2024     Lab Results   Component Value Date    PREGTESTUR Negative 12/31/2021     Pain Management Panel  More data exists         Latest Ref Rng & Units 5/1/2025 3/11/2024   Pain Management Panel   Creatinine, Urine mg/dL 194.5  196.8    Amphetamine, Urine Qual Negative Negative  -   Barbiturates Screen, Urine Negative Negative  -   Benzodiazepine Screen, Urine Negative Negative  -   Buprenorphine, Screen, Urine Negative Negative  -   Cocaine Screen, Urine Negative Negative  -   Fentanyl, Urine Negative Negative  -   Methadone " "Screen , Urine Negative Negative  -   Methamphetamine, Ur Negative Negative  -     Brief Urine Lab Results  (Last result in the past 365 days)        Color   Clarity   Blood   Leuk Est   Nitrite   Protein   CREAT   Urine HCG        05/01/25 1615             194.5         05/01/25 1615 Orange  Comment: Dipstick results may be inaccurate due to color interference.       Cloudy   Large (3+)   Moderate (2+)   Positive   30 mg/dL (1+)                 No results found for: \"BLOODCX\"  Urine Culture   Date Value Ref Range Status   05/01/2025 No growth  Preliminary     No results found for: \"WOUNDCX\"  No results found for: \"STOOLCX\"  No results found for: \"RESPCX\"  No results found for: \"AFBCX\"  Results from last 7 days   Lab Units 05/01/25  1700   LACTATE mmol/L 0.9       I have personally looked at the labs and they are summarized above.  ----------------------------------------------------------------------------------------------------------------------  Detailed radiology reports for the last 24 hours:    Imaging Results (Last 24 Hours)       Procedure Component Value Units Date/Time    CT Abdomen Pelvis Stone Protocol [[id number]] Collected: 05/01/25 1724     Updated: 05/01/25 1737    Narrative:      Procedure: Routine axial CT images of abdomen and pelvis acquired  without IV contrast. Additional 3 mm thick coronal and sagittal  reformats acquired. CT scan performed according to ALARA(as low as  reasonably achievable)dose protocol.     Comparison: None available     Findings:     Non-obstructing bilateral renal stones  No hydronephrosis is seen.  There are no focal inflammatory changes identified.  No free air or fluid seen.  No dilated small or large bowel.  No grossly thickened bowel loops or appendix despite the lack of  contrast. Normal appendix.     IUD noted.      Visualized lung bases are negative.       Impression:         No acute process in the abdomen/pelvis.     This report was finalized on 5/1/2025 5:35 PM " by Jenna Sneed MD.             Assessment & Plan    #Acute non-oliguric kidney injury, suspect pre-renal,  poor oral intake +/- recently resolved obstruction  #Suspect acute cystitis with microscopic hematuria  #Previous recurrent urinary tract infection  #Previous nephrolithiasis  #Interstitial cystitis  - CT abdomen/pelvis with stone protocol revealed nonobstructing bilateral renal stones with no hydronephrosis and no focal inflammatory changes   - CK wnl.   -  Admission BUN/creatinine 23 and 2.57;   - Givn 3L NS and started on bicarb w/ 1/2 NS.   - Renal function has continued to improve. Will continue IVF at 75 cc/hr and repeat labs in AM   - Continue ceftriaxone, follow urine culture.     #Normocytic anemia   #Mild thrombocytopenia   - Hemoglobin and platelets dropped overnight. Possibly dilutional. Repeat in AM.     Chronic medical conditions:  -Bilateral leg edema on bumex as needed  -IBS-C  -Endometriosis  -Depression    Code status: Full     Dispo:     Misha London MD  [facility] Hospitalist  05/02/25  16:05 EDT

## 2025-05-02 NOTE — PLAN OF CARE
Goal Outcome Evaluation:  Plan of Care Reviewed With: patient        Progress: improving  Outcome Evaluation: Patient resting in bed at this itme. A&O. VSS on RA. No acute changes noted. Patient has ambulated in room and to restroom throughout this shift. No requests or ocmplaints at this time. Will continue with POC.

## 2025-05-02 NOTE — PLAN OF CARE
Goal Outcome Evaluation:  Plan of Care Reviewed With: patient        Progress: improving  Outcome Evaluation: Pt admitted from the ER this shift. Resting in bed at this time. Pt has ambulated in room. Bicarb infusing as ordered. No concerns or requests at this time. Will continue plan of care.

## 2025-05-03 VITALS
TEMPERATURE: 98.6 F | DIASTOLIC BLOOD PRESSURE: 60 MMHG | RESPIRATION RATE: 16 BRPM | HEIGHT: 66 IN | HEART RATE: 81 BPM | BODY MASS INDEX: 20.23 KG/M2 | WEIGHT: 125.88 LBS | SYSTOLIC BLOOD PRESSURE: 100 MMHG | OXYGEN SATURATION: 99 %

## 2025-05-03 LAB
ANION GAP SERPL CALCULATED.3IONS-SCNC: 6.1 MMOL/L (ref 5–15)
BACTERIA SPEC AEROBE CULT: NO GROWTH
BASOPHILS # BLD AUTO: 0.03 10*3/MM3 (ref 0–0.2)
BASOPHILS NFR BLD AUTO: 0.5 % (ref 0–1.5)
BUN SERPL-MCNC: 15 MG/DL (ref 6–20)
BUN/CREAT SERPL: 9.9 (ref 7–25)
CALCIUM SPEC-SCNC: 7.9 MG/DL (ref 8.6–10.5)
CHLORIDE SERPL-SCNC: 113 MMOL/L (ref 98–107)
CO2 SERPL-SCNC: 22.9 MMOL/L (ref 22–29)
CREAT SERPL-MCNC: 1.51 MG/DL (ref 0.57–1)
DEPRECATED RDW RBC AUTO: 43.5 FL (ref 37–54)
EGFRCR SERPLBLD CKD-EPI 2021: 46.3 ML/MIN/1.73
EOSINOPHIL # BLD AUTO: 0.25 10*3/MM3 (ref 0–0.4)
EOSINOPHIL NFR BLD AUTO: 3.9 % (ref 0.3–6.2)
ERYTHROCYTE [DISTWIDTH] IN BLOOD BY AUTOMATED COUNT: 12.6 % (ref 12.3–15.4)
FERRITIN SERPL-MCNC: 128.5 NG/ML (ref 13–150)
FOLATE SERPL-MCNC: 10.4 NG/ML (ref 4.78–24.2)
GLUCOSE SERPL-MCNC: 88 MG/DL (ref 65–99)
HCT VFR BLD AUTO: 35.1 % (ref 34–46.6)
HGB BLD-MCNC: 11.3 G/DL (ref 12–15.9)
IMM GRANULOCYTES # BLD AUTO: 0.02 10*3/MM3 (ref 0–0.05)
IMM GRANULOCYTES NFR BLD AUTO: 0.3 % (ref 0–0.5)
IRON 24H UR-MRATE: 50 MCG/DL (ref 37–145)
IRON SATN MFR SERPL: 31 % (ref 20–50)
LYMPHOCYTES # BLD AUTO: 2.17 10*3/MM3 (ref 0.7–3.1)
LYMPHOCYTES NFR BLD AUTO: 34.1 % (ref 19.6–45.3)
MCH RBC QN AUTO: 30.2 PG (ref 26.6–33)
MCHC RBC AUTO-ENTMCNC: 32.2 G/DL (ref 31.5–35.7)
MCV RBC AUTO: 93.9 FL (ref 79–97)
MONOCYTES # BLD AUTO: 0.45 10*3/MM3 (ref 0.1–0.9)
MONOCYTES NFR BLD AUTO: 7.1 % (ref 5–12)
NEUTROPHILS NFR BLD AUTO: 3.45 10*3/MM3 (ref 1.7–7)
NEUTROPHILS NFR BLD AUTO: 54.1 % (ref 42.7–76)
NRBC BLD AUTO-RTO: 0 /100 WBC (ref 0–0.2)
PLATELET # BLD AUTO: 120 10*3/MM3 (ref 140–450)
PMV BLD AUTO: 11.7 FL (ref 6–12)
POTASSIUM SERPL-SCNC: 4.3 MMOL/L (ref 3.5–5.2)
RBC # BLD AUTO: 3.74 10*6/MM3 (ref 3.77–5.28)
SODIUM SERPL-SCNC: 142 MMOL/L (ref 136–145)
TIBC SERPL-MCNC: 159 MCG/DL (ref 298–536)
TRANSFERRIN SERPL-MCNC: 107 MG/DL (ref 200–360)
VIT B12 BLD-MCNC: 507 PG/ML (ref 211–946)
WBC NRBC COR # BLD AUTO: 6.37 10*3/MM3 (ref 3.4–10.8)

## 2025-05-03 PROCEDURE — 83540 ASSAY OF IRON: CPT | Performed by: INTERNAL MEDICINE

## 2025-05-03 PROCEDURE — 84466 ASSAY OF TRANSFERRIN: CPT | Performed by: INTERNAL MEDICINE

## 2025-05-03 PROCEDURE — 82607 VITAMIN B-12: CPT | Performed by: INTERNAL MEDICINE

## 2025-05-03 PROCEDURE — 99239 HOSP IP/OBS DSCHRG MGMT >30: CPT | Performed by: INTERNAL MEDICINE

## 2025-05-03 PROCEDURE — 82746 ASSAY OF FOLIC ACID SERUM: CPT | Performed by: INTERNAL MEDICINE

## 2025-05-03 PROCEDURE — 82728 ASSAY OF FERRITIN: CPT | Performed by: INTERNAL MEDICINE

## 2025-05-03 PROCEDURE — 80048 BASIC METABOLIC PNL TOTAL CA: CPT | Performed by: INTERNAL MEDICINE

## 2025-05-03 PROCEDURE — 85025 COMPLETE CBC W/AUTO DIFF WBC: CPT | Performed by: INTERNAL MEDICINE

## 2025-05-03 RX ORDER — CEFDINIR 300 MG/1
300 CAPSULE ORAL 2 TIMES DAILY
Qty: 6 CAPSULE | Refills: 0 | Status: SHIPPED | OUTPATIENT
Start: 2025-05-03 | End: 2025-05-06

## 2025-05-03 RX ADMIN — TOPIRAMATE 50 MG: 25 TABLET, FILM COATED ORAL at 08:29

## 2025-05-03 RX ADMIN — ESCITALOPRAM OXALATE 40 MG: 10 TABLET ORAL at 08:29

## 2025-05-03 NOTE — PLAN OF CARE
Goal Outcome Evaluation:   Pt has rested well this shift,vss,farnaz,omartm

## 2025-05-03 NOTE — DISCHARGE SUMMARY
[facility] HOSPITALISTS DISCHARGE SUMMARY    Patient Identification:  Name:  Latisha Mccain  Age:  35 y.o.  Sex:  female  :  1990  MRN:  [id number]  Visit Number:  [id number]    Date of Admission: 2025  Date of Discharge:  5/3/2025    PCP: Andrea Scruggs, APRN    DISCHARGE DIAGNOSIS  #Acute non-oliguric kidney injury, suspect pre-renal,  poor oral intake +/- recently resolved obstruction +/- possible NSAID use based on med rec   #Suspect acute cystitis with microscopic hematuria  #Previous recurrent urinary tract infection  #Previous nephrolithiasis  #Interstitial cystitis  #Normocytic anemia   #Mild thrombocytopenia      Chronic medical conditions:  -Bilateral leg edema on bumex as needed  -IBS-C  -Endometriosis  -Depression       CONSULTS   None     PROCEDURES PERFORMED  None     HOSPITAL COURSE  Patient is a 35 y.o. female presented on  to [facility] complaining of bilateral flank pain.  Please see the admitting history and physical for further details.      Ms. Bello is our 36 yo F with hx of IBS/C, endometriosis, recurrent cystitis/urinary tract infections and interstitial cystitis who presented with bilateral flank pain. Urinary frequency, dark colored urine other symptoms. Patient was given macrobid to take outpatient earlier in the week. Admission CT abdomen/pelvis with stone protocol revealed nonobstructing bilateral renal stones with no hydronephrosis and no focal inflammatory changes. Baseline creatinine appears to be 0.9 but patient reports previous AKIs and has followed with Dr. He before. Admission BUN/creatinine 23 and 2.57. Patient's symptoms have resolved. Patient started on IVF and renal function has also significantly improved. Cr down to 1.51. Hemoglobin dropped initially, but suspect intravascular dilution as repeat is stable. Patient tolerated PO intake well and recommended to hydrate well the next few days. Urine culture no growth, will send  with 5 total days of omnicef and follow culture. Patient to f/u with nephrology and PCP with repeat labsi n 1 week. Patient denies any active symptoms today or concerns returning home. I have held ibuprofen that was listed on home med list and recommended to avoid all NSAIDs.     VITAL SIGNS:  Temp:  [98.2 °F (36.8 °C)-98.6 °F (37 °C)] 98.6 °F (37 °C)  Resp:  [16] 16  BP: (100-127)/(60-71) 100/60     on   ;   Device (Oxygen Therapy): room air    Body mass index is 20.32 kg/m².  Wt Readings from Last 3 Encounters:   05/01/25 57.1 kg (125 lb 14.1 oz)   02/06/24 62.6 kg (138 lb)   01/06/22 67.1 kg (148 lb)       PHYSICAL EXAM:  Constitutional:  Well-developed and well-nourished.  No respiratory distress.      HENT:  Head:  Normocephalic and atraumatic.  Mouth:  Moist mucous membranes.    Eyes:  Conjunctivae and EOM are normal.  Pupils are equal, round, and reactive to light.  No scleral icterus.    Cardiovascular:  Normal rate, regular rhythm and normal heart sounds with no murmur.  Pulmonary/Chest:  No respiratory distress, no wheezes, no crackles, with normal breath sounds and good air movement.  Abdominal:  Soft.  Bowel sounds are normal.  No distension and no tenderness.   Musculoskeletal:  No edema, no tenderness, and no deformity.  No red or swollen joints anywhere.    Neurological:  Alert and oriented to person, place, and time.  No gross neurological deficit.   Skin:  Skin is warm and dry. No rash noted. No pallor.   Peripheral vascular:  Strong pulses in all 4 extremities with no clubbing, no cyanosis, no edema.    DISCHARGE DISPOSITION   Stable    DISCHARGE MEDICATIONS:     Discharge Medications        New Medications        Instructions Start Date   cefdinir 300 MG capsule  Commonly known as: OMNICEF   300 mg, Oral, 2 Times Daily             Changes to Medications        Instructions Start Date   promethazine 25 MG tablet  Commonly known as: PHENERGAN  What changed: Another medication with the same name was  removed. Continue taking this medication, and follow the directions you see here.   25 mg, Oral, Every 6 Hours PRN             Continue These Medications        Instructions Start Date   escitalopram 20 MG tablet  Commonly known as: LEXAPRO   40 mg, Daily      fluconazole 150 MG tablet  Commonly known as: DIFLUCAN   150 mg, Oral, As Needed      furosemide 20 MG tablet  Commonly known as: LASIX   20 mg, Oral, Daily PRN      lidocaine 5 %  Commonly known as: LIDODERM   1 patch, Transdermal, Daily PRN, Remove & Discard patch within 12 hours or as directed by MD      ondansetron ODT 8 MG disintegrating tablet  Commonly known as: ZOFRAN-ODT   8 mg, Translingual, Every 6 Hours PRN      tiZANidine 2 MG tablet  Commonly known as: ZANAFLEX   2 mg, Oral, Daily PRN      topiramate 50 MG tablet  Commonly known as: TOPAMAX   50 mg, Oral, Daily      traZODone 100 MG tablet  Commonly known as: DESYREL   100 mg, Oral, Nightly             Stop These Medications      ibuprofen 800 MG tablet  Commonly known as: ADVIL,MOTRIN     potassium chloride 10 MEQ CR capsule  Commonly known as: MICRO-K                 Follow-up Information       Andrea Scruggs APRN In 2 days.    Specialty: Nurse Practitioner  Contact information:  [address] [place] [address]  [phone]               Andrea Scruggs APRN Follow up in 1 week(s).    Specialty: Nurse Practitioner  Why: Please get BMP at visit.  Contact information:  [address] [place] [address]  [phone]               Cody He MD Follow up.    Specialty: Nephrology  Why: Patient has seen Dr. He in past, recurrent HUBER  Contact information:  [address] [place] [address] [phone]                              TEST  RESULTS PENDING AT DISCHARGE  Pending Labs       Order Current Status    Folate In process    Vitamin B12 In process    Urine Culture - Urine, Urine, Clean Catch Preliminary result             CODE STATUS  Code Status and  Medical Interventions: CPR (Attempt to Resuscitate); Full Support   Ordered at: 05/01/25 2122     Code Status (Patient has no pulse and is not breathing):    CPR (Attempt to Resuscitate)     Medical Interventions (Patient has pulse or is breathing):    Full Support       Misha London MD  [facility]  05/03/25  08:51 EDT    Please note that this discharge summary required more than 30 minutes to complete.

## 2025-05-05 ENCOUNTER — TELEPHONE (OUTPATIENT)
Dept: MEDSURG UNIT | Facility: HOSPITAL | Age: 35
End: 2025-05-05
Payer: COMMERCIAL

## 2025-05-13 NOTE — PAYOR COMM NOTE
"CONTACT: MARICEL WOLFF RN  UTILIZATION MANAGEMENT DEPT.  [address]  [place] [address]  PHONE: [phone]  FAX: [phone]        DISCHARGE NOTIFICATION  DC DATE: 5/3/25 to home    Courtesy notice of dc .    Member id: [id number]    Viraj Seo (35 y.o. Female)       Date of Birth   1990    Social Security Number       Address   [address] [place] [address] [place] [address]    Home Phone   [phone]    MRN   [id number]       [facility]    Marital Status                               Admission Date   5/1/2025    Admission Type   Emergency    Admitting Provider   Ratna Abad DO    Attending Provider       Department, Room/Bed   [address], 3338/1P       Discharge Date   5/3/2025    Discharge Disposition   Home or Self Care    Discharge Destination                                 Attending Provider: (none)   Allergies: No Known Allergies    Isolation: None   Infection: None   Code Status: Prior    Ht: 167.6 cm (66\")   Wt: 57.1 kg (125 lb 14.1 oz)    Admission Cmt: None   Principal Problem: HUBER (acute kidney injury) [N17.9]                   Active Insurance as of 5/1/2025       Primary Coverage       Payor Plan Insurance Group Employer/Plan Group    [facility] [employer] [facility]        Payor Plan Address Payor Plan Phone Number Payor Plan Fax Number Effective Dates    [address]   1/1/2020 - None Entered    [facility] [id number]         Subscriber Name Subscriber Birth Date Member ID       VIRAJ SEO 1990 [id number]                     Emergency Contacts        (Rel.) Home Phone Work Phone Mobile Phone    Lawrence Seo (Spouse) [phone] -- [phone]    BriceRebeca (Mother) [phone] -- --                 Discharge Summary        Misha London MD at 05/03/25 0851              [facility] HOSPITALISTS DISCHARGE SUMMARY    Patient Identification:  Name:  Viraj" RAVINDRA Mccain  Age:  35 y.o.  Sex:  female  :  1990  MRN:  [id number]  Visit Number:  [id number]    Date of Admission: 2025  Date of Discharge:  5/3/2025    PCP: Andrea Scruggs, APRN    DISCHARGE DIAGNOSIS  #Acute non-oliguric kidney injury, suspect pre-renal,  poor oral intake +/- recently resolved obstruction +/- possible NSAID use based on med rec   #Suspect acute cystitis with microscopic hematuria  #Previous recurrent urinary tract infection  #Previous nephrolithiasis  #Interstitial cystitis  #Normocytic anemia   #Mild thrombocytopenia      Chronic medical conditions:  -Bilateral leg edema on bumex as needed  -IBS-C  -Endometriosis  -Depression       CONSULTS   None     PROCEDURES PERFORMED  None     HOSPITAL COURSE  Patient is a 35 y.o. female presented on  to [facility] complaining of bilateral flank pain.  Please see the admitting history and physical for further details.      Ms. Bello is our 36 yo F with hx of IBS/C, endometriosis, recurrent cystitis/urinary tract infections and interstitial cystitis who presented with bilateral flank pain. Urinary frequency, dark colored urine other symptoms. Patient was given macrobid to take outpatient earlier in the week. Admission CT abdomen/pelvis with stone protocol revealed nonobstructing bilateral renal stones with no hydronephrosis and no focal inflammatory changes. Baseline creatinine appears to be 0.9 but patient reports previous AKIs and has followed with Dr. He before. Admission BUN/creatinine 23 and 2.57. Patient's symptoms have resolved. Patient started on IVF and renal function has also significantly improved. Cr down to 1.51. Hemoglobin dropped initially, but suspect intravascular dilution as repeat is stable. Patient tolerated PO intake well and recommended to hydrate well the next few days. Urine culture no growth, will send with 5 total days of omnicef and follow culture. Patient to f/u with nephrology and PCP with repeat  labsi n 1 week. Patient denies any active symptoms today or concerns returning home. I have held ibuprofen that was listed on home med list and recommended to avoid all NSAIDs.     VITAL SIGNS:  Temp:  [98.2 °F (36.8 °C)-98.6 °F (37 °C)] 98.6 °F (37 °C)  Resp:  [16] 16  BP: (100-127)/(60-71) 100/60     on   ;   Device (Oxygen Therapy): room air    Body mass index is 20.32 kg/m².  Wt Readings from Last 3 Encounters:   05/01/25 57.1 kg (125 lb 14.1 oz)   02/06/24 62.6 kg (138 lb)   01/06/22 67.1 kg (148 lb)       PHYSICAL EXAM:  Constitutional:  Well-developed and well-nourished.  No respiratory distress.      HENT:  Head:  Normocephalic and atraumatic.  Mouth:  Moist mucous membranes.    Eyes:  Conjunctivae and EOM are normal.  Pupils are equal, round, and reactive to light.  No scleral icterus.    Cardiovascular:  Normal rate, regular rhythm and normal heart sounds with no murmur.  Pulmonary/Chest:  No respiratory distress, no wheezes, no crackles, with normal breath sounds and good air movement.  Abdominal:  Soft.  Bowel sounds are normal.  No distension and no tenderness.   Musculoskeletal:  No edema, no tenderness, and no deformity.  No red or swollen joints anywhere.    Neurological:  Alert and oriented to person, place, and time.  No gross neurological deficit.   Skin:  Skin is warm and dry. No rash noted. No pallor.   Peripheral vascular:  Strong pulses in all 4 extremities with no clubbing, no cyanosis, no edema.    DISCHARGE DISPOSITION   Stable    DISCHARGE MEDICATIONS:     Discharge Medications        New Medications        Instructions Start Date   cefdinir 300 MG capsule  Commonly known as: OMNICEF   300 mg, Oral, 2 Times Daily             Changes to Medications        Instructions Start Date   promethazine 25 MG tablet  Commonly known as: PHENERGAN  What changed: Another medication with the same name was removed. Continue taking this medication, and follow the directions you see here.   25 mg, Oral,  Every 6 Hours PRN             Continue These Medications        Instructions Start Date   escitalopram 20 MG tablet  Commonly known as: LEXAPRO   40 mg, Daily      fluconazole 150 MG tablet  Commonly known as: DIFLUCAN   150 mg, Oral, As Needed      furosemide 20 MG tablet  Commonly known as: LASIX   20 mg, Oral, Daily PRN      lidocaine 5 %  Commonly known as: LIDODERM   1 patch, Transdermal, Daily PRN, Remove & Discard patch within 12 hours or as directed by MD      ondansetron ODT 8 MG disintegrating tablet  Commonly known as: ZOFRAN-ODT   8 mg, Translingual, Every 6 Hours PRN      tiZANidine 2 MG tablet  Commonly known as: ZANAFLEX   2 mg, Oral, Daily PRN      topiramate 50 MG tablet  Commonly known as: TOPAMAX   50 mg, Oral, Daily      traZODone 100 MG tablet  Commonly known as: DESYREL   100 mg, Oral, Nightly             Stop These Medications      ibuprofen 800 MG tablet  Commonly known as: ADVIL,MOTRIN     potassium chloride 10 MEQ CR capsule  Commonly known as: MICRO-K                 Follow-up Information       Andrea Scruggs APRN In 2 days.    Specialty: Nurse Practitioner  Contact information:  [address] [place], [place] [id number] [phone]               Andrea Scruggs APRN Follow up in 1 week(s).    Specialty: Nurse Practitioner  Why: Please get BMP at visit.  Contact information:  [address] [place] [address]  [phone]               Cody He MD Follow up.    Specialty: Nephrology  Why: Patient has seen Dr. He in past, recurrent HUBER  Contact information:  [address] [place] [address] [phone]                              TEST  RESULTS PENDING AT DISCHARGE  Pending Labs       Order Current Status    Folate In process    Vitamin B12 In process    Urine Culture - Urine, Urine, Clean Catch Preliminary result             CODE STATUS  Code Status and Medical Interventions: CPR (Attempt to Resuscitate); Full Support   Ordered at: 05/01/25 1215      Code Status (Patient has no pulse and is not breathing):    CPR (Attempt to Resuscitate)     Medical Interventions (Patient has pulse or is breathing):    Full Support       Sharron London MD  [facility]  05/03/25  08:51 EDT    Please note that this discharge summary required more than 30 minutes to complete.      Electronically signed by Sharron London MD at 05/03/25 0906       Discharge Order (From admission, onward)       Start     Ordered    05/03/25 0850  Discharge patient  Once        Expected Discharge Date: 05/03/25   Expected Discharge Time: Morning   Discharge Disposition: Home or Self Care   Physician of Record for Attribution - Please select from Treatment Team: SHARRON LONDON [[id number]]   Review needed by CMO to determine Physician of Record: No      Question Answer Comment   Physician of Record for Attribution - Please select from Treatment Team SHARRON LONDON    Review needed by CMO to determine Physician of Record No        05/03/25 0850

## (undated) DEVICE — GLV SURG PREMIERPRO MIC LTX PF SZ8 BRN

## (undated) DEVICE — ENDOPATH XCEL BLADELESS TROCARS WITH STABILITY SLEEVES: Brand: ENDOPATH XCEL

## (undated) DEVICE — COR CYSTO: Brand: MEDLINE INDUSTRIES, INC.

## (undated) DEVICE — PAD GRND REM POLYHESIVE A/ DISP

## (undated) DEVICE — GOWN,REINF,POLY,ECL,PP SLV,XXL: Brand: MEDLINE

## (undated) DEVICE — [HIGH FLOW INSUFFLATOR,  DO NOT USE IF PACKAGE IS DAMAGED,  KEEP DRY,  KEEP AWAY FROM SUNLIGHT,  PROTECT FROM HEAT AND RADIOACTIVE SOURCES.]: Brand: PNEUMOSURE

## (undated) DEVICE — PAD SANI MAXI W/ADHS SNG WRP 11IN

## (undated) DEVICE — COR GYN LAPAROSCOPY: Brand: MEDLINE INDUSTRIES, INC.

## (undated) DEVICE — SUT MNCRYL 4/0 PS2 18 IN

## (undated) DEVICE — APPL CHLORAPREP W/TINT 26ML ORNG

## (undated) DEVICE — HARMONIC ACE +7 LAPAROSCOPIC SHEARS ADVANCED HEMOSTASIS 5MM DIAMETER 36CM SHAFT LENGTH  FOR USE WITH GRAY HAND PIECE ONLY: Brand: HARMONIC ACE

## (undated) DEVICE — SKIN AFFIX SURG ADHESIVE 72/CS 0.55ML: Brand: MEDLINE

## (undated) DEVICE — ENDOPATH XCEL UNIVERSAL TROCAR STABLILITY SLEEVES: Brand: ENDOPATH XCEL

## (undated) DEVICE — TRY SKINPREP PVP SCRB W PAINT